# Patient Record
Sex: MALE | Race: OTHER | NOT HISPANIC OR LATINO | Employment: FULL TIME | URBAN - METROPOLITAN AREA
[De-identification: names, ages, dates, MRNs, and addresses within clinical notes are randomized per-mention and may not be internally consistent; named-entity substitution may affect disease eponyms.]

---

## 2020-12-21 ENCOUNTER — NURSE TRIAGE (OUTPATIENT)
Dept: OTHER | Facility: OTHER | Age: 35
End: 2020-12-21

## 2021-08-04 ENCOUNTER — OFFICE VISIT (OUTPATIENT)
Dept: FAMILY MEDICINE CLINIC | Facility: CLINIC | Age: 36
End: 2021-08-04
Payer: COMMERCIAL

## 2021-08-04 VITALS
WEIGHT: 251.6 LBS | DIASTOLIC BLOOD PRESSURE: 78 MMHG | HEART RATE: 73 BPM | BODY MASS INDEX: 33.34 KG/M2 | HEIGHT: 73 IN | TEMPERATURE: 97.5 F | OXYGEN SATURATION: 96 % | SYSTOLIC BLOOD PRESSURE: 112 MMHG | RESPIRATION RATE: 18 BRPM

## 2021-08-04 DIAGNOSIS — B36.0 TINEA VERSICOLOR: ICD-10-CM

## 2021-08-04 DIAGNOSIS — R10.9 ABDOMINAL CRAMPING: ICD-10-CM

## 2021-08-04 DIAGNOSIS — R19.7 DIARRHEA, UNSPECIFIED TYPE: Primary | ICD-10-CM

## 2021-08-04 DIAGNOSIS — Z76.89 ENCOUNTER TO ESTABLISH CARE: ICD-10-CM

## 2021-08-04 PROCEDURE — 99203 OFFICE O/P NEW LOW 30 MIN: CPT | Performed by: NURSE PRACTITIONER

## 2021-08-04 PROCEDURE — 3008F BODY MASS INDEX DOCD: CPT | Performed by: NURSE PRACTITIONER

## 2021-08-04 PROCEDURE — 1036F TOBACCO NON-USER: CPT | Performed by: NURSE PRACTITIONER

## 2021-08-04 PROCEDURE — 3725F SCREEN DEPRESSION PERFORMED: CPT | Performed by: NURSE PRACTITIONER

## 2021-08-04 RX ORDER — CLOTRIMAZOLE 1 %
CREAM (GRAM) TOPICAL 2 TIMES DAILY
Qty: 30 G | Refills: 0 | Status: SHIPPED | OUTPATIENT
Start: 2021-08-04 | End: 2022-02-08

## 2021-08-04 NOTE — PROGRESS NOTES
FAMILY PRACTICE OFFICE VISIT       NAME: Samuel Andrade  AGE: 38 y o  SEX: male       : 1985        MRN: [id number]    Assessment and Plan     Problem List Items Addressed This Visit     None      Visit Diagnoses     Diarrhea, unspecified type    -  Primary    Relevant Orders    CBC and differential    Comprehensive metabolic panel    TSH, 3rd generation    Celiac Disease Antibody Profile    H  pylori antigen, stool    Giardia antigen    Fecal leukocytes    Ova and parasite examination    Stool Enteric Bacterial Panel by PCR    Calprotectin,Fecal    Clostridium difficile toxin by PCR    Ambulatory referral to Gastroenterology    C-reactive protein    Abdominal cramping        Relevant Orders    Ambulatory referral to Gastroenterology    C-reactive protein    Tinea versicolor        Relevant Medications    clotrimazole (LOTRIMIN) 1 % cream    Encounter to establish care              1  Diarrhea, unspecified type  CBC and differential    Comprehensive metabolic panel    TSH, 3rd generation    Celiac Disease Antibody Profile    H  pylori antigen, stool    Giardia antigen    Fecal leukocytes    Ova and parasite examination    Stool Enteric Bacterial Panel by PCR    Calprotectin,Fecal    Clostridium difficile toxin by PCR    Ambulatory referral to Gastroenterology    C-reactive protein   2  Abdominal cramping  Ambulatory referral to Gastroenterology    C-reactive protein   3  Tinea versicolor  clotrimazole (LOTRIMIN) 1 % cream   4  Encounter to establish care       This 28-year-old male presents today to establish care at our office  He has had no recent PCP  Denies any past medical history  Past surgical history includes hernia repair  Has struggled with intermittent mild diarrhea and abdominal cramping for the past 20 years  However, over the past 4 weeks diarrhea and abdominal cramping have been worse, and have never lasted this long    Unclear etiology of symptoms, need to rule out inflammatory bowel disease, infection, colitis, Celiac disease, and consider IBS  Will check blood work and stool samples as noted  Recommend follow-up with Gastroenterology  Once completing blood work and stool samples, will consider trial prescription of dicyclomine  If symptoms should worsen, he is advised to call  Tinea versicolor: Will trial clotrimazole 1% cream   He will call if not effective  Chief Complaint     Chief Complaint   Patient presents with    Diarrhea     X's 3-4 weeks- daily- having 3-4 loose BM's  Denies hematochezia, N/V, fever    New Patient Visit     Establish care       History of Present Illness     Anahi Raymond is a 57-year-old male presenting today to [facility]  Grew up in [place], lives there the for 17 years of his life  Family moved to the [place] when he was 14years old  He has 7 siblings  He has a fraternal twin and is the youngest of the family  Currently  with 2 young children  Currently working full-time,   Denies any significant past medical history  Past surgical history includes hernia repair  Past family history updated and as noted  Currently smokes a few cigarettes on weekends,   Occasionally vapes, occasionally uses marijuana edibles  Tinea versicolor on neck  Has had in the past, treated with topicals  Has had intermittent abdominal pain and diarrhea for the past 20 years  Diarrhea, sometimes painful  Sometimes pain does not resolve with bowel movement  Generalized pain  First started 20 year ago  Usually, would come and go and be mild  Four weeks ago, started every day  Sometimes so bad can't leave the house  Minimum 2 stools per day, usually 3-4 per day  Worse with spicy and fatty foods  Does occur with other foods as well  But cannot associate symptoms with particular foods  No blood or mucous in stool  No black stool  No nausea vomiting  No unexpected weight changes     Appetite good   Fluids tolerating  No one else at home has been sick with similar symptoms  No fevers  No recent antibiotics  No recent travel out of the country  Review of Systems   Review of Systems   Constitutional: Negative  HENT: Negative  Respiratory: Negative  Cardiovascular: Negative  Gastrointestinal: Positive for abdominal pain and diarrhea  Negative for abdominal distention, anal bleeding, blood in stool, constipation, nausea, rectal pain and vomiting  Genitourinary: Negative  Musculoskeletal: Negative  Skin: Positive for rash  Neurological: Negative  Hematological: Negative  Psychiatric/Behavioral: Negative  Active Problem List   There is no problem list on file for this patient  Past Medical History:  History reviewed  No pertinent past medical history      Past Surgical History:  Past Surgical History:   Procedure Laterality Date    HERNIA REPAIR  2018       Family History:  Family History   Problem Relation Age of Onset    Hypertension Mother     Heart disease Mother     Arthritis Mother     Alzheimer's disease Mother     Cancer Father 76        liver    Diabetes Father        Social History:  Social History     Socioeconomic History    Marital status: /Civil Union     Spouse name: Not on file    Number of children: Not on file    Years of education: Not on file    Highest education level: Not on file   Occupational History    Not on file   Tobacco Use    Smoking status: Former Smoker     Types: Cigarettes     Quit date: 2021     Years since quittin 2    Smokeless tobacco: Never Used    Tobacco comment: few cigarettes on weekends only   Vaping Use    Vaping Use: Some days    Substances: THC (Edibles), Flavoring   Substance and Sexual Activity    Alcohol use: Never    Drug use: Yes     Types: Marijuana    Sexual activity: Yes     Partners: Female   Other Topics Concern    Not on file   Social History Narrative         2 children    Working- construction inspection  Social Determinants of Health     Financial Resource Strain:     Difficulty of Paying Living Expenses:    Food Insecurity:     Worried About Running Out of Food in the Last Year:     [address] in the Last Year:    Transportation Needs:     Lack of Transportation (Medical):  Lack of Transportation (Non-Medical):    Physical Activity:     Days of Exercise per Week:     Minutes of Exercise per Session:    Stress:     Feeling of Stress :    Social Connections:     Frequency of Communication with Friends and Family:     Frequency of Social Gatherings with Friends and Family:     Attends Rastafarian Services:     Active Member of Clubs or Organizations:     Attends Club or Organization Meetings:     Marital Status:    Intimate Partner Violence:     Fear of Current or Ex-Partner:     Emotionally Abused:     Physically Abused:     Sexually Abused:        I have reviewed the patient's medical history in detail; there are no changes to the history as noted in the electronic medical record  Objective     Vitals:    08/04/21 1825   BP: 112/78   BP Location: Left arm   Patient Position: Sitting   Cuff Size: Large   Pulse: 73   Resp: 18   Temp: 97 5 °F (36 4 °C)   TempSrc: Temporal   SpO2: 96%   Weight: 114 kg (251 lb 9 6 oz)   Height: 6' 1" (1 854 m)     Wt Readings from Last 3 Encounters:   08/04/21 114 kg (251 lb 9 6 oz)     Physical Exam  Vitals and nursing note reviewed  Constitutional:       General: He is not in acute distress  Appearance: Normal appearance  He is well-developed  He is not ill-appearing  HENT:      Head: Normocephalic and atraumatic  Right Ear: Tympanic membrane and ear canal normal       Left Ear: Tympanic membrane and ear canal normal       Mouth/Throat:      Mouth: Mucous membranes are moist       Pharynx: Oropharynx is clear     Eyes:      Conjunctiva/sclera: Conjunctivae normal  Pupils: Pupils are equal, round, and reactive to light  Neck:      Thyroid: No thyromegaly  Cardiovascular:      Rate and Rhythm: Normal rate and regular rhythm  Heart sounds: No murmur heard  Pulmonary:      Effort: Pulmonary effort is normal       Breath sounds: Normal breath sounds  Abdominal:      General: Bowel sounds are normal  There is no distension  Palpations: Abdomen is soft  Tenderness: There is no abdominal tenderness  There is no guarding  Musculoskeletal:      Cervical back: Normal range of motion and neck supple  Right lower leg: No edema  Left lower leg: No edema  Lymphadenopathy:      Cervical: No cervical adenopathy  Skin:     Findings: No rash  Neurological:      Mental Status: He is alert and oriented to person, place, and time  Gait: Gait normal    Psychiatric:         Mood and Affect: Mood normal        BMI Counseling: Body mass index is 33 19 kg/m²  The BMI is above normal  Exercise recommendations include moderate physical activity 150 minutes/week  ALLERGIES:  No Known Allergies    Current Medications     Current Outpatient Medications   Medication Sig Dispense Refill    clotrimazole (LOTRIMIN) 1 % cream Apply topically 2 (two) times a day 30 g 0     No current facility-administered medications for this visit           Health Maintenance     Health Maintenance   Topic Date Due    Hepatitis C Screening  Never done    COVID-19 Vaccine (1) Never done    HIV Screening  Never done    BMI: Followup Plan  Never done    Annual Physical  Never done    DTaP,Tdap,and Td Vaccines (1 - Tdap) Never done    Influenza Vaccine (1) 09/01/2021    Depression Screening PHQ  08/04/2022    BMI: Adult  08/04/2022    Pneumococcal Vaccine: Pediatrics (0 to 5 Years) and At-Risk Patients (6 to 59 Years)  Aged Out    HIB Vaccine  Aged Out    Hepatitis B Vaccine  Aged Out    IPV Vaccine  Aged Out    Hepatitis A Vaccine  Aged Out    Meningococcal ACWY Vaccine  Aged Out    HPV Vaccine  Aged Out       There is no immunization history on file for this patient      Amirah Snell

## 2021-08-07 ENCOUNTER — APPOINTMENT (OUTPATIENT)
Dept: LAB | Facility: CLINIC | Age: 36
End: 2021-08-07
Payer: COMMERCIAL

## 2021-08-07 DIAGNOSIS — R19.7 DIARRHEA, UNSPECIFIED TYPE: ICD-10-CM

## 2021-08-07 DIAGNOSIS — R10.9 ABDOMINAL CRAMPING: ICD-10-CM

## 2021-08-07 LAB
ALBUMIN SERPL BCP-MCNC: 4 G/DL (ref 3.5–5)
ALP SERPL-CCNC: 43 U/L (ref 46–116)
ALT SERPL W P-5'-P-CCNC: 57 U/L (ref 12–78)
ANION GAP SERPL CALCULATED.3IONS-SCNC: 4 MMOL/L (ref 4–13)
AST SERPL W P-5'-P-CCNC: 22 U/L (ref 5–45)
BASOPHILS # BLD AUTO: 0.04 THOUSANDS/ΜL (ref 0–0.1)
BASOPHILS NFR BLD AUTO: 1 % (ref 0–1)
BILIRUB SERPL-MCNC: 0.69 MG/DL (ref 0.2–1)
BUN SERPL-MCNC: 12 MG/DL (ref 5–25)
C DIFF TOX B TCDB STL QL NAA+PROBE: NEGATIVE
CALCIUM SERPL-MCNC: 8.8 MG/DL (ref 8.3–10.1)
CHLORIDE SERPL-SCNC: 109 MMOL/L (ref 100–108)
CO2 SERPL-SCNC: 29 MMOL/L (ref 21–32)
CREAT SERPL-MCNC: 0.79 MG/DL (ref 0.6–1.3)
CRP SERPL QL: 3.9 MG/L
EOSINOPHIL # BLD AUTO: 0.06 THOUSAND/ΜL (ref 0–0.61)
EOSINOPHIL NFR BLD AUTO: 1 % (ref 0–6)
ERYTHROCYTE [DISTWIDTH] IN BLOOD BY AUTOMATED COUNT: 14.5 % (ref 11.6–15.1)
GFR SERPL CREATININE-BSD FRML MDRD: 115 ML/MIN/1.73SQ M
GLUCOSE P FAST SERPL-MCNC: 93 MG/DL (ref 65–99)
HCT VFR BLD AUTO: 46 % (ref 36.5–49.3)
HGB BLD-MCNC: 15.4 G/DL (ref 12–17)
IMM GRANULOCYTES # BLD AUTO: 0.05 THOUSAND/UL (ref 0–0.2)
IMM GRANULOCYTES NFR BLD AUTO: 1 % (ref 0–2)
LYMPHOCYTES # BLD AUTO: 2.54 THOUSANDS/ΜL (ref 0.6–4.47)
LYMPHOCYTES NFR BLD AUTO: 49 % (ref 14–44)
MCH RBC QN AUTO: 28.9 PG (ref 26.8–34.3)
MCHC RBC AUTO-ENTMCNC: 33.5 G/DL (ref 31.4–37.4)
MCV RBC AUTO: 86 FL (ref 82–98)
MONOCYTES # BLD AUTO: 0.49 THOUSAND/ΜL (ref 0.17–1.22)
MONOCYTES NFR BLD AUTO: 10 % (ref 4–12)
NEUTROPHILS # BLD AUTO: 1.99 THOUSANDS/ΜL (ref 1.85–7.62)
NEUTS SEG NFR BLD AUTO: 38 % (ref 43–75)
NRBC BLD AUTO-RTO: 0 /100 WBCS
PLATELET # BLD AUTO: 250 THOUSANDS/UL (ref 149–390)
PMV BLD AUTO: 9.8 FL (ref 8.9–12.7)
POTASSIUM SERPL-SCNC: 4.2 MMOL/L (ref 3.5–5.3)
PROT SERPL-MCNC: 7.4 G/DL (ref 6.4–8.2)
RBC # BLD AUTO: 5.33 MILLION/UL (ref 3.88–5.62)
SODIUM SERPL-SCNC: 142 MMOL/L (ref 136–145)
TSH SERPL DL<=0.05 MIU/L-ACNC: 0.56 UIU/ML (ref 0.36–3.74)
WBC # BLD AUTO: 5.17 THOUSAND/UL (ref 4.31–10.16)

## 2021-08-07 PROCEDURE — 82784 ASSAY IGA/IGD/IGG/IGM EACH: CPT

## 2021-08-07 PROCEDURE — 89055 LEUKOCYTE ASSESSMENT FECAL: CPT

## 2021-08-07 PROCEDURE — 85025 COMPLETE CBC W/AUTO DIFF WBC: CPT

## 2021-08-07 PROCEDURE — 86255 FLUORESCENT ANTIBODY SCREEN: CPT

## 2021-08-07 PROCEDURE — 36415 COLL VENOUS BLD VENIPUNCTURE: CPT

## 2021-08-07 PROCEDURE — 83993 ASSAY FOR CALPROTECTIN FECAL: CPT

## 2021-08-07 PROCEDURE — 84443 ASSAY THYROID STIM HORMONE: CPT

## 2021-08-07 PROCEDURE — 83516 IMMUNOASSAY NONANTIBODY: CPT

## 2021-08-07 PROCEDURE — 80053 COMPREHEN METABOLIC PANEL: CPT

## 2021-08-07 PROCEDURE — 87338 HPYLORI STOOL AG IA: CPT

## 2021-08-07 PROCEDURE — 87209 SMEAR COMPLEX STAIN: CPT

## 2021-08-07 PROCEDURE — 86140 C-REACTIVE PROTEIN: CPT

## 2021-08-07 PROCEDURE — 87505 NFCT AGENT DETECTION GI: CPT

## 2021-08-07 PROCEDURE — 87177 OVA AND PARASITES SMEARS: CPT

## 2021-08-08 LAB
CAMPYLOBACTER DNA SPEC NAA+PROBE: NORMAL
H PYLORI AG STL QL IA: POSITIVE
SALMONELLA DNA SPEC QL NAA+PROBE: NORMAL
SHIGA TOXIN STX GENE SPEC NAA+PROBE: NORMAL
SHIGELLA DNA SPEC QL NAA+PROBE: NORMAL

## 2021-08-09 ENCOUNTER — TELEPHONE (OUTPATIENT)
Dept: FAMILY MEDICINE CLINIC | Facility: CLINIC | Age: 36
End: 2021-08-09

## 2021-08-09 DIAGNOSIS — R19.7 DIARRHEA, UNSPECIFIED TYPE: Primary | ICD-10-CM

## 2021-08-09 DIAGNOSIS — R10.9 ABDOMINAL CRAMPING: ICD-10-CM

## 2021-08-09 LAB
ENDOMYSIUM IGA SER QL: NEGATIVE
G LAMBLIA AG STL QL IA: NEGATIVE
GLIADIN PEPTIDE IGA SER-ACNC: 2 UNITS (ref 0–19)
GLIADIN PEPTIDE IGG SER-ACNC: 3 UNITS (ref 0–19)
IGA SERPL-MCNC: 184 MG/DL (ref 90–386)
TTG IGA SER-ACNC: 3 U/ML (ref 0–3)
TTG IGG SER-ACNC: 5 U/ML (ref 0–5)
WBC SPEC QL GRAM STN: NORMAL

## 2021-08-09 RX ORDER — DICYCLOMINE HYDROCHLORIDE 10 MG/1
10 CAPSULE ORAL 3 TIMES DAILY PRN
Qty: 30 CAPSULE | Refills: 0 | Status: SHIPPED | OUTPATIENT
Start: 2021-08-09 | End: 2021-08-31

## 2021-08-09 NOTE — TELEPHONE ENCOUNTER
Patient called, stated that he got his blood work on Saturday, patient stated that he would like the medication to sent over to [facility] in [place]

## 2021-08-09 NOTE — TELEPHONE ENCOUNTER
Please let patient know I sent a prescription to [facility] for him for dicyclomine  Take 1 tablet  up to 3 times daily as needed for abdominal cramping or diarrhea  I am still waiting for all of his blood work and stool sample results to return  Once all of the results are back, will call to review

## 2021-08-10 LAB — CALPROTECTIN STL-MCNT: <16 UG/G (ref 0–120)

## 2021-08-11 ENCOUNTER — TELEPHONE (OUTPATIENT)
Dept: FAMILY MEDICINE CLINIC | Facility: CLINIC | Age: 36
End: 2021-08-11

## 2021-08-11 DIAGNOSIS — A04.8 H. PYLORI INFECTION: Primary | ICD-10-CM

## 2021-08-11 RX ORDER — LANSOPRAZOLE, AMOXICILLIN, CLARITHROMYCIN 30-500-500
KIT ORAL 2 TIMES DAILY
Qty: 112 EACH | Refills: 0 | Status: SHIPPED | OUTPATIENT
Start: 2021-08-11 | End: 2021-08-17 | Stop reason: ALTCHOICE

## 2021-08-11 NOTE — TELEPHONE ENCOUNTER
Please contact patient with recent test results  Blood work is overall good  CRP--which is an inflammatory marker is slightly elevated  All other blood work is good  Stool samples are all negative, except for H  Pylori  This is a bacteria found in the stomach that can cause an ulcer  I do not believe this is the cause of your diarrhea  However, it does need to be treated with antibiotics  I sent antibiotics to your pharmacy  This is called a prevpack  Follow the directions on the pack  You will need to take 4 pills in the morning and 4 pills in the evening for 14 days  It includes 2 different antibiotics clindamycin and amoxicillin, as well as an acid reducing medication lansoprazole  You will then need to repeat a stool sample for H  Pylori 4 weeks after finishing all of the antibiotics  Keep appointment with gastroenterology as scheduled  How is the dicyclomine working for cramping and diarrhea?

## 2021-08-12 LAB — O+P STL CONC: NORMAL

## 2021-08-12 NOTE — TELEPHONE ENCOUNTER
Spoke with patient  Made aware of  results and provider's instructions  Patient verbalized understanding and was agreeable w/ plan  Pt reports Dicyclomine is working and helping him

## 2021-08-17 ENCOUNTER — TELEPHONE (OUTPATIENT)
Dept: FAMILY MEDICINE CLINIC | Facility: CLINIC | Age: 36
End: 2021-08-17

## 2021-08-17 DIAGNOSIS — A04.8 H. PYLORI INFECTION: Primary | ICD-10-CM

## 2021-08-17 RX ORDER — LANSOPRAZOLE 30 MG/1
30 CAPSULE, DELAYED RELEASE ORAL 2 TIMES DAILY
Qty: 28 CAPSULE | Refills: 0 | Status: SHIPPED | OUTPATIENT
Start: 2021-08-17 | End: 2022-02-08

## 2021-08-17 RX ORDER — AMOXICILLIN 500 MG/1
1000 TABLET, FILM COATED ORAL 2 TIMES DAILY
Qty: 56 TABLET | Refills: 0 | Status: SHIPPED | OUTPATIENT
Start: 2021-08-17 | End: 2021-08-31

## 2021-08-17 RX ORDER — CLARITHROMYCIN 500 MG/1
500 TABLET, COATED ORAL EVERY 12 HOURS SCHEDULED
Qty: 28 TABLET | Refills: 0 | Status: SHIPPED | OUTPATIENT
Start: 2021-08-17 | End: 2021-08-31

## 2021-08-17 NOTE — TELEPHONE ENCOUNTER
Patients insurance will not cover the Prevpac  With a prior auth it is still $150  Would you be able to cancel this script and send in the 3 medications separately  They will be covered this way    Please call patient to advise

## 2021-08-18 NOTE — TELEPHONE ENCOUNTER
Please let patient know I sent each individual medication that is in the Prevpac to the pharmacy  This will be 2 antibiotics, amoxicillin take 2 tablets twice daily, and clarithromycin take 1 tablet twice daily  It also includes an antacid medication lansoprazole (Prevacid), take 1 capsule twice daily  Take the amoxicillin, clarithromycin, and lansoprazole all at the same time 2 twice daily for 14 days

## 2021-08-31 ENCOUNTER — TELEPHONE (OUTPATIENT)
Dept: FAMILY MEDICINE CLINIC | Facility: CLINIC | Age: 36
End: 2021-08-31

## 2021-08-31 DIAGNOSIS — R10.9 ABDOMINAL CRAMPING: ICD-10-CM

## 2021-08-31 DIAGNOSIS — R19.7 DIARRHEA, UNSPECIFIED TYPE: ICD-10-CM

## 2021-08-31 RX ORDER — DICYCLOMINE HCL 20 MG
20 TABLET ORAL EVERY 6 HOURS PRN
Qty: 120 TABLET | Refills: 1 | Status: SHIPPED | OUTPATIENT
Start: 2021-08-31 | End: 2021-12-23 | Stop reason: SDUPTHER

## 2021-08-31 NOTE — TELEPHONE ENCOUNTER
Increased diarrhea is most likely from the antibiotics  It would be best if he is able to hang in there for a few more days to finish the antibiotics  I did send a new prescription for Dicyclomine, 20 mg every 6 hours as needed  It may not help a lot with diarrhea from antibiotics

## 2021-08-31 NOTE — TELEPHONE ENCOUNTER
Patient said he has been having very bad diarrhea since starting his antibiotic and he still has 2 or 3 days left of taking them  Patient said he also needs more of the dicyclomine, however he wants to know if there is a stronger dose or medication than the original prescription as patient said the diarrhea is really bad   Please advise

## 2021-12-23 ENCOUNTER — TELEPHONE (OUTPATIENT)
Dept: GASTROENTEROLOGY | Facility: AMBULARY SURGERY CENTER | Age: 36
End: 2021-12-23

## 2021-12-23 ENCOUNTER — CONSULT (OUTPATIENT)
Dept: GASTROENTEROLOGY | Facility: AMBULARY SURGERY CENTER | Age: 36
End: 2021-12-23
Payer: COMMERCIAL

## 2021-12-23 VITALS
HEIGHT: 73 IN | WEIGHT: 258.4 LBS | BODY MASS INDEX: 34.25 KG/M2 | DIASTOLIC BLOOD PRESSURE: 74 MMHG | SYSTOLIC BLOOD PRESSURE: 114 MMHG

## 2021-12-23 DIAGNOSIS — R19.7 DIARRHEA, UNSPECIFIED TYPE: Primary | ICD-10-CM

## 2021-12-23 DIAGNOSIS — R10.9 ABDOMINAL CRAMPING: ICD-10-CM

## 2021-12-23 DIAGNOSIS — R10.13 DYSPEPSIA: ICD-10-CM

## 2021-12-23 PROCEDURE — 3008F BODY MASS INDEX DOCD: CPT | Performed by: INTERNAL MEDICINE

## 2021-12-23 PROCEDURE — 99204 OFFICE O/P NEW MOD 45 MIN: CPT | Performed by: INTERNAL MEDICINE

## 2021-12-23 RX ORDER — DICYCLOMINE HCL 20 MG
20 TABLET ORAL EVERY 6 HOURS PRN
Qty: 120 TABLET | Refills: 1 | Status: SHIPPED | OUTPATIENT
Start: 2021-12-23

## 2021-12-23 RX ORDER — PANTOPRAZOLE SODIUM 40 MG/1
40 TABLET, DELAYED RELEASE ORAL DAILY
Qty: 90 TABLET | Refills: 0 | Status: SHIPPED | OUTPATIENT
Start: 2021-12-23 | End: 2022-02-28

## 2022-02-03 ENCOUNTER — TELEPHONE (OUTPATIENT)
Dept: PREADMISSION TESTING | Facility: HOSPITAL | Age: 37
End: 2022-02-03

## 2022-02-08 NOTE — PRE-PROCEDURE INSTRUCTIONS
Pre-Surgery Instructions:   Medication Instructions    dicyclomine (BENTYL) 20 mg tablet Patient was instructed by Physician and understands   pantoprazole (PROTONIX) 40 mg tablet Patient was instructed by Physician and understands   Probiotic Product (PROBIOTIC ADVANCED PO) Patient was instructed by Physician and understands

## 2022-02-15 ENCOUNTER — HOSPITAL ENCOUNTER (OUTPATIENT)
Dept: GASTROENTEROLOGY | Facility: AMBULARY SURGERY CENTER | Age: 37
Setting detail: OUTPATIENT SURGERY
Discharge: HOME | End: 2022-02-15
Attending: INTERNAL MEDICINE
Payer: COMMERCIAL

## 2022-02-15 ENCOUNTER — ANESTHESIA (OUTPATIENT)
Dept: GASTROENTEROLOGY | Facility: AMBULARY SURGERY CENTER | Age: 37
End: 2022-02-15

## 2022-02-15 ENCOUNTER — ANESTHESIA EVENT (OUTPATIENT)
Dept: GASTROENTEROLOGY | Facility: AMBULARY SURGERY CENTER | Age: 37
End: 2022-02-15

## 2022-02-15 VITALS
WEIGHT: 258 LBS | BODY MASS INDEX: 34.19 KG/M2 | OXYGEN SATURATION: 96 % | DIASTOLIC BLOOD PRESSURE: 72 MMHG | RESPIRATION RATE: 16 BRPM | TEMPERATURE: 98.4 F | HEIGHT: 73 IN | HEART RATE: 62 BPM | SYSTOLIC BLOOD PRESSURE: 124 MMHG

## 2022-02-15 DIAGNOSIS — R19.7 DIARRHEA, UNSPECIFIED TYPE: ICD-10-CM

## 2022-02-15 DIAGNOSIS — R10.13 DYSPEPSIA: ICD-10-CM

## 2022-02-15 DIAGNOSIS — R10.9 ABDOMINAL CRAMPING: ICD-10-CM

## 2022-02-15 PROCEDURE — 88342 IMHCHEM/IMCYTCHM 1ST ANTB: CPT | Performed by: PATHOLOGY

## 2022-02-15 PROCEDURE — 88305 TISSUE EXAM BY PATHOLOGIST: CPT | Performed by: PATHOLOGY

## 2022-02-15 PROCEDURE — 45380 COLONOSCOPY AND BIOPSY: CPT | Performed by: INTERNAL MEDICINE

## 2022-02-15 PROCEDURE — 43239 EGD BIOPSY SINGLE/MULTIPLE: CPT | Performed by: INTERNAL MEDICINE

## 2022-02-15 PROCEDURE — 88341 IMHCHEM/IMCYTCHM EA ADD ANTB: CPT | Performed by: PATHOLOGY

## 2022-02-15 RX ORDER — PROPOFOL 10 MG/ML
INJECTION, EMULSION INTRAVENOUS AS NEEDED
Status: DISCONTINUED | OUTPATIENT
Start: 2022-02-15 | End: 2022-02-15

## 2022-02-15 RX ORDER — PROPOFOL 10 MG/ML
INJECTION, EMULSION INTRAVENOUS CONTINUOUS PRN
Status: DISCONTINUED | OUTPATIENT
Start: 2022-02-15 | End: 2022-02-15

## 2022-02-15 RX ORDER — LIDOCAINE HYDROCHLORIDE 10 MG/ML
INJECTION, SOLUTION EPIDURAL; INFILTRATION; INTRACAUDAL; PERINEURAL AS NEEDED
Status: DISCONTINUED | OUTPATIENT
Start: 2022-02-15 | End: 2022-02-15

## 2022-02-15 RX ORDER — SODIUM CHLORIDE, SODIUM LACTATE, POTASSIUM CHLORIDE, CALCIUM CHLORIDE 600; 310; 30; 20 MG/100ML; MG/100ML; MG/100ML; MG/100ML
INJECTION, SOLUTION INTRAVENOUS CONTINUOUS PRN
Status: DISCONTINUED | OUTPATIENT
Start: 2022-02-15 | End: 2022-02-15

## 2022-02-15 RX ADMIN — LIDOCAINE HYDROCHLORIDE 50 MG: 10 INJECTION, SOLUTION EPIDURAL; INFILTRATION; INTRACAUDAL; PERINEURAL at 10:28

## 2022-02-15 RX ADMIN — PROPOFOL 120 MCG/KG/MIN: 10 INJECTION, EMULSION INTRAVENOUS at 10:28

## 2022-02-15 RX ADMIN — PROPOFOL 50 MG: 10 INJECTION, EMULSION INTRAVENOUS at 10:29

## 2022-02-15 RX ADMIN — SODIUM CHLORIDE, SODIUM LACTATE, POTASSIUM CHLORIDE, AND CALCIUM CHLORIDE: .6; .31; .03; .02 INJECTION, SOLUTION INTRAVENOUS at 10:17

## 2022-02-15 RX ADMIN — PROPOFOL 50 MG: 10 INJECTION, EMULSION INTRAVENOUS at 10:32

## 2022-02-15 RX ADMIN — PROPOFOL 100 MG: 10 INJECTION, EMULSION INTRAVENOUS at 10:28

## 2022-02-15 RX ADMIN — PROPOFOL 30 MG: 10 INJECTION, EMULSION INTRAVENOUS at 10:30

## 2022-02-15 NOTE — ANESTHESIA POSTPROCEDURE EVALUATION
Post-Op Assessment Note    CV Status:  Stable  Pain Score: 0    Pain management: adequate     Mental Status:  Arousable and sleepy   Hydration Status:  Stable   PONV Controlled:  None   Airway Patency:  Positional obstruction and patent      Post Op Vitals Reviewed: Yes      Staff: CRNA   Comments: spontaneously breathing, protecting airway, vss, fully endorsed to recovery w/o AC        No complications documented      BP      Temp      Pulse     Resp      SpO2   98

## 2022-02-15 NOTE — H&P
History and Physical -  [facility]  Samuel Lazarosedrick 43 y o  male MRN: [id number]    HPI: Veronica Bey is a 43y o  year old male who presents for evaluation of dyspepsia and chronic diarrhea  Review of Systems    Historical Information   Past Medical History:   Diagnosis Date    GERD (gastroesophageal reflux disease)      Past Surgical History:   Procedure Laterality Date    HERNIA REPAIR  2018     Social History   Social History     Substance and Sexual Activity   Alcohol Use Yes    Comment: Rare     Social History     Substance and Sexual Activity   Drug Use Yes    Types: Marijuana     Social History     Tobacco Use   Smoking Status Current Every Day Smoker    Types: Cigarettes    Last attempt to quit: 2021    Years since quittin 7   Smokeless Tobacco Never Used   Tobacco Comment    vapes also     Family History   Problem Relation Age of Onset    Hypertension Mother     Heart disease Mother     Arthritis Mother     Alzheimer's disease Mother     Cancer Father 76        liver    Diabetes Father        Meds/Allergies     (Not in a hospital admission)      No Known Allergies    Objective     /81   Pulse 83   Temp 98 4 °F (36 9 °C) (Tympanic)   Resp 16   Ht 6' 1" (1 854 m)   Wt 117 kg (258 lb)   SpO2 96%   BMI 34 04 kg/m²       PHYSICAL EXAM    Gen: NAD  CV: RRR  CHEST: Clear  ABD: soft, NT/ND  EXT: no edema  Neuro: AAO      ASSESSMENT/PLAN:  This is a 43y o  year old male here for evaluation of dyspepsia symptoms and chronic diarrhea  PLAN:   Procedure:  EGD and colonoscopy

## 2022-02-15 NOTE — ANESTHESIA PREPROCEDURE EVALUATION
Procedure:  COLONOSCOPY  EGD    Relevant Problems   No relevant active problems        Physical Exam    Airway    Mallampati score: II  TM Distance: >3 FB  Neck ROM: full     Dental   No notable dental hx     Cardiovascular  Cardiovascular exam normal    Pulmonary  Pulmonary exam normal     Other Findings        Anesthesia Plan  ASA Score- 2     Anesthesia Type- IV sedation with anesthesia with ASA Monitors  Additional Monitors:   Airway Plan:           Plan Factors-Exercise tolerance (METS): >4 METS  Chart reviewed  Imaging results reviewed  Existing labs reviewed  Patient summary reviewed  Patient is a current smoker  Patient smoked on day of surgery  Obstructive sleep apnea risk education given perioperatively  Induction-     Postoperative Plan-     Informed Consent- Anesthetic plan and risks discussed with patient  I personally reviewed this patient with the CRNA  Discussed and agreed on the Anesthesia Plan with the CRNA  Dawn Vaughn

## 2022-02-22 ENCOUNTER — TELEPHONE (OUTPATIENT)
Dept: FAMILY MEDICINE CLINIC | Facility: CLINIC | Age: 37
End: 2022-02-22

## 2022-02-22 NOTE — TELEPHONE ENCOUNTER
Patient called and stated that he tested positive for COVID on 1/7/2022, he had his first covid vaccine on 12/27/2021  When is is ok for him to have his second Vaccine? He needs a note for work stating when he could have his second vaccine or he will not be able to return to work until he has it  Please call patient to advise on what he should do

## 2022-02-23 NOTE — TELEPHONE ENCOUNTER
Spoke with patient and provided message    Patient will go and have his second vaccine done and he said thank you/

## 2022-02-23 NOTE — TELEPHONE ENCOUNTER
I am assuming he had first dose of Pfizer or Kimberlee Post, and needs to have the second dose to complete the primary series  He may have his second vaccine at any time now  May provide a note stating this if needed

## 2022-02-28 DIAGNOSIS — R10.13 DYSPEPSIA: ICD-10-CM

## 2022-02-28 RX ORDER — PANTOPRAZOLE SODIUM 40 MG/1
TABLET, DELAYED RELEASE ORAL
Qty: 90 TABLET | Refills: 3 | Status: SHIPPED | OUTPATIENT
Start: 2022-02-28

## 2022-03-09 ENCOUNTER — TELEPHONE (OUTPATIENT)
Dept: GASTROENTEROLOGY | Facility: AMBULARY SURGERY CENTER | Age: 37
End: 2022-03-09

## 2022-03-09 NOTE — TELEPHONE ENCOUNTER
Pt aware of results and verbalized understanding  Pt does not wish to schedule a follow up at this time  Advised to call office with any worsening symptoms or concerns

## 2022-03-09 NOTE — TELEPHONE ENCOUNTER
----- Message from Colten Dai MD sent at 3/3/2022  7:11 AM EST -----  No evidence of H pylori or celiac disease  No need for repeat EGD  Would recommend to continue pantoprazole 40 mg on daily basis for now  Biopsies from the colon were negative for microscopic inflammation and small-bowel biopsies were negative for Crohn's disease or chronic inflammation  Would recommend repeat colonoscopy when he turns 37 for screening purposes  Would recommend office visit for follow-up if still having symptoms

## 2022-03-09 NOTE — TELEPHONE ENCOUNTER
----- Message from Hilda Huerta MD sent at 3/3/2022  7:11 AM EST -----  No evidence of H pylori or celiac disease  No need for repeat EGD  Would recommend to continue pantoprazole 40 mg on daily basis for now  Biopsies from the colon were negative for microscopic inflammation and small-bowel biopsies were negative for Crohn's disease or chronic inflammation  Would recommend repeat colonoscopy when he turns 37 for screening purposes  Would recommend office visit for follow-up if still having symptoms

## 2022-11-01 ENCOUNTER — OFFICE VISIT (OUTPATIENT)
Dept: FAMILY MEDICINE CLINIC | Facility: CLINIC | Age: 37
End: 2022-11-01

## 2022-11-01 VITALS
WEIGHT: 262.25 LBS | RESPIRATION RATE: 18 BRPM | OXYGEN SATURATION: 97 % | SYSTOLIC BLOOD PRESSURE: 110 MMHG | BODY MASS INDEX: 34.76 KG/M2 | HEIGHT: 73 IN | DIASTOLIC BLOOD PRESSURE: 90 MMHG | HEART RATE: 86 BPM | TEMPERATURE: 97.8 F

## 2022-11-01 DIAGNOSIS — B35.9 TINEA: ICD-10-CM

## 2022-11-01 DIAGNOSIS — R10.9 ABDOMINAL CRAMPING: ICD-10-CM

## 2022-11-01 DIAGNOSIS — M25.562 ACUTE PAIN OF LEFT KNEE: ICD-10-CM

## 2022-11-01 DIAGNOSIS — R53.82 CHRONIC FATIGUE: ICD-10-CM

## 2022-11-01 DIAGNOSIS — K64.9 HEMORRHOIDS, UNSPECIFIED HEMORRHOID TYPE: Primary | ICD-10-CM

## 2022-11-01 DIAGNOSIS — R10.13 DYSPEPSIA: ICD-10-CM

## 2022-11-01 DIAGNOSIS — Z00.00 PERIODIC HEALTH ASSESSMENT, GENERAL SCREENING, ADULT: ICD-10-CM

## 2022-11-01 RX ORDER — CLOTRIMAZOLE AND BETAMETHASONE DIPROPIONATE 10; .64 MG/G; MG/G
CREAM TOPICAL 2 TIMES DAILY
Qty: 30 G | Refills: 0 | Status: SHIPPED | OUTPATIENT
Start: 2022-11-01

## 2022-11-01 RX ORDER — HYDROCORTISONE 25 MG/G
CREAM TOPICAL 2 TIMES DAILY
Qty: 28 G | Refills: 1 | Status: SHIPPED | OUTPATIENT
Start: 2022-11-01

## 2022-11-01 RX ORDER — DICYCLOMINE HCL 20 MG
20 TABLET ORAL EVERY 6 HOURS PRN
Qty: 120 TABLET | Refills: 1 | Status: SHIPPED | OUTPATIENT
Start: 2022-11-01

## 2022-11-01 RX ORDER — PANTOPRAZOLE SODIUM 40 MG/1
40 TABLET, DELAYED RELEASE ORAL DAILY
Qty: 90 TABLET | Refills: 3 | Status: SHIPPED | OUTPATIENT
Start: 2022-11-01

## 2022-11-01 RX ORDER — MELOXICAM 15 MG/1
15 TABLET ORAL DAILY
Qty: 15 TABLET | Refills: 1 | Status: SHIPPED | OUTPATIENT
Start: 2022-11-01

## 2022-11-01 NOTE — PROGRESS NOTES
FAMILY PRACTICE OFFICE VISIT       NAME: Samuel Andrade  AGE: 42 y o  SEX: male       : 1985        MRN: [id number]    DATE: 2022  TIME: 6:24 AM    Assessment and Plan     Problem List Items Addressed This Visit        Digestive    Hemorrhoids - Primary     Hemorrhoid  Patient with small hemorrhoidal tissue along perirectal area  He was given prescription for Anusol cream to apply twice daily for 7 days  Patient will call if symptoms persist with bleeding  Relevant Medications    hydrocortisone (ANUSOL-HC) 2 5 % rectal cream       Musculoskeletal and Integument    Tinea     Tinea corporis  Patient given prescription for Lotrisone cream to apply twice daily to the affected area up to 2 weeks  If symptoms persist without improvement he will call the office for further evaluation         Relevant Medications    clotrimazole-betamethasone (LOTRISONE) 1-0 05 % cream       Other    Abdominal cramping     Abdominal cramping  Patient was given a refill on his dicyclomine which he takes q 6 hours p r n  for intermittent abdominal cramps         Relevant Medications    dicyclomine (BENTYL) 20 mg tablet    Dyspepsia     Dyspepsia  Patient was given a refill on his pantoprazole and medication which he takes daily as per GI         Relevant Medications    pantoprazole (PROTONIX) 40 mg tablet    Periodic health assessment, general screening, adult    Relevant Orders    CBC    Comprehensive metabolic panel    Lipid panel    TSH, 3rd generation    Acute pain of left knee     Knee pain  Patient given prescription for meloxicam 15 mg take 1 p o  q day for a minimum of 2 weeks  If symptoms recur or persist he was given a prescription to obtain x-ray of knee for further evaluation         Relevant Medications    meloxicam (Mobic) 15 mg tablet    Other Relevant Orders    XR knee 3 vw right non injury    XR knee 3 vw left non injury    Fatigue     Fatigue    Patient will obtain blood work as ordered for evaluation of his symptoms of fatigue, decreased concentration, and memory lapses  We will make further recommendations pending results of test   If all testing within normal limits we will consider further evaluation such as CT scan or neuro psychiatric testing                   Chief Complaint     Chief Complaint   Patient presents with   • Frostbite   • Rectal Bleeding     X 3 week    • L knee pain        History of Present Illness     Patient in the office with several complaints  He describes several months to year history of intermittent left knee discomfort  He denies any prior injuries  He states symptoms are more noticeable when he is more active as a   At times symptoms will resolve spontaneously  Denies any redness or swelling of knee  Occasionally he will take over-the-counter Tylenol or Advil  Patient had EGD and colonoscopy earlier this year for history of rectal bleeding  There were slight areas of inflammation but no significant findings  Biopsies were negative for any significant disease  Over the past few weeks patient has noted occasions at least twice a week where he sees bright red blood on tissue paper  He denies any pain with bowel movements nor has he had any constipation or diarrhea  He does feel a slight nodule along his rectal area and that is nontender  Patient also has a recurrence of previously diagnosed tinea infection along his left neck area  In the past he was prescribed a cream for which she does not recall  Has never had any oral medication  Rashes minimally pruritic    Patient also describes a few month history of feeling decreased concentration especially at work with some fatigue and being more forgetful lately  There is no family history of dementia  Patient denies any significant new stressors in his life      Review of Systems   Review of Systems   Constitutional: Negative  HENT: Negative  Respiratory: Negative  Cardiovascular: Negative  Gastrointestinal:        As per HPI   Musculoskeletal: Positive for arthralgias  Skin: Positive for color change  Neurological:        As per HPI X 6 mos   Psychiatric/Behavioral: Negative  Active Problem List     Patient Active Problem List   Diagnosis   • Diarrhea   • Abdominal cramping   • Dyspepsia   • Periodic health assessment, general screening, adult   • Hemorrhoids   • Acute pain of left knee   • Tinea   • Fatigue       Past Medical History:  Past Medical History:   Diagnosis Date   • GERD (gastroesophageal reflux disease)        Past Surgical History:  Past Surgical History:   Procedure Laterality Date   • HERNIA REPAIR  2018       Family History:  Family History   Problem Relation Age of Onset   • Hypertension Mother    • Heart disease Mother    • Arthritis Mother    • Alzheimer's disease Mother    • Cancer Father 76        liver   • Diabetes Father        Social History:  Social History     Socioeconomic History   • Marital status: /Civil Union     Spouse name: Not on file   • Number of children: Not on file   • Years of education: Not on file   • Highest education level: Not on file   Occupational History   • Not on file   Tobacco Use   • Smoking status: Current Every Day Smoker     Types: Cigarettes     Last attempt to quit: 2021     Years since quittin 5   • Smokeless tobacco: Never Used   • Tobacco comment: vapes also   Vaping Use   • Vaping Use: Some days   • Substances: THC (Edibles), Flavoring   Substance and Sexual Activity   • Alcohol use: Yes     Comment: Rare   • Drug use: Yes     Types: Marijuana   • Sexual activity: Yes     Partners: Female   Other Topics Concern   • Not on file   Social History Narrative         2 children    Working- construction inspection       Social Determinants of Health     Financial Resource Strain: Not on file   Food Insecurity: Not on file   Transportation Needs: Not on file   Physical Activity: Not on file   Stress: Not on file   Social Connections: Not on file   Intimate Partner Violence: Not on file   Housing Stability: Not on file       Objective     Vitals:    11/01/22 1739   BP: 110/90   Pulse: 86   Resp: 18   Temp: 97 8 °F (36 6 °C)   SpO2: 97%     Wt Readings from Last 3 Encounters:   11/01/22 119 kg (262 lb 4 oz)   02/15/22 117 kg (258 lb)   12/23/21 117 kg (258 lb 6 4 oz)       Physical Exam  Constitutional:       Appearance: Normal appearance  HENT:      Head: Normocephalic and atraumatic  Eyes:      General:         Right eye: No discharge  Left eye: No discharge  Extraocular Movements: Extraocular movements intact  Conjunctiva/sclera: Conjunctivae normal       Pupils: Pupils are equal, round, and reactive to light  Cardiovascular:      Rate and Rhythm: Normal rate and regular rhythm  Heart sounds: Normal heart sounds  No murmur heard  Pulmonary:      Effort: Pulmonary effort is normal  No respiratory distress  Breath sounds: Normal breath sounds  No wheezing, rhonchi or rales  Abdominal:      General: Abdomen is flat  Bowel sounds are normal  There is no distension  Palpations: Abdomen is soft  Tenderness: There is no abdominal tenderness  There is no guarding or rebound  Genitourinary:     Comments: Patient with small skin colored nodule along perirectal area with no active drainage or bleeding  No other abnormalities noted  Musculoskeletal:         General: No swelling, tenderness, deformity or signs of injury  Right lower leg: No edema  Left lower leg: No edema  Comments: Left knee with normal range of motion  Negative swelling noted  Negative point tenderness to palpation  Muscle strength 5/5  Negative Lachman's or Yeny's testing   Skin:     Findings: Rash present  Comments: Patient with scattered hyperpigmented areas along the left neck area   Neurological:      General: No focal deficit present        Mental Status: He is alert and oriented to person, place, and time  Mental status is at baseline  Psychiatric:         Mood and Affect: Mood normal          Behavior: Behavior normal          Thought Content:  Thought content normal          Judgment: Judgment normal          Pertinent Laboratory/Diagnostic Studies:  Lab Results   Component Value Date    BUN 12 08/07/2021    CREATININE 0 79 08/07/2021    CALCIUM 8 8 08/07/2021    K 4 2 08/07/2021    CO2 29 08/07/2021     (H) 08/07/2021     Lab Results   Component Value Date    ALT 57 08/07/2021    AST 22 08/07/2021    ALKPHOS 43 (L) 08/07/2021       Lab Results   Component Value Date    WBC 5 17 08/07/2021    HGB 15 4 08/07/2021    HCT 46 0 08/07/2021    MCV 86 08/07/2021     08/07/2021       No results found for: TSH    No results found for: CHOL  No results found for: TRIG  No results found for: HDL  No results found for: LDLCALC  No results found for: HGBA1C    Results for orders placed or performed during the hospital encounter of 02/15/22   Tissue Exam   Result Value Ref Range    Case Report       Surgical Pathology Report                         Case: [id number]                                   Authorizing Provider:  Mel Sofia MD       Collected:           02/15/2022 1032              Ordering Location:     [facility]   Received:            02/15/2022 [facility]                                                                       Pathologist:           David Vale MD                                                         Specimens:   A) - Duodenum, Bx Duodenum, r/o Celiac                                                              B) - Stomach, Gastric Bx, r/o h  pylori                                                             C) - Terminal Ileum, Bx Terminal Ileum, r/o Colitis                                                 D) - Large Intestine, Right/Ascending Colon, Bx Ascending Colon, r/o Microscopic                    Colitis                                                                                              E) - Large Intestine, Left/Descending Colon, Bx Descending Colon, r/o Microscopic                   Colitis                                                                                    Final Diagnosis       A  Duodenum (biopsy):  - Duodenal mucosa with no diagnostic abnormality  - No Marsh lesion  - Negative for dysplasia     B  Stomach (biopsy):  - Chronic inactive antral gastritis  - No H pylori identified on immunohistochemistry stain   - No intestinal metaplasia     Comment:  While no H pylori are identified on immunohistochemistry stain the overall features are suspicious for an H pylori infection  H pylori may not be detected secondary to recent antibiotic exposure or use of proton pump inhibitors, as well as the presence of intestinal metaplasia  Fecal antigen testing may be considered if clinically indicated  - CK AE1/3 highlights crypts  C  Terminal ileum (biopsy):  - Small bowel mucosa with reactive lymphoid aggregates  - No active enteritis   - Negative for dysplasia     D  Ascending colon (biopsy):  - Colonic mucosa with no diagnostic abnormality  - No active or microscopic colitis  - Negative for dysplasia     E  Descending colon (biopsy):  - Colonic mucosa with no diagnostic abnormality  - No active or microscopic colitis  - Negative for dysplasia         Additional Information       All reported additional testing was performed with appropriately reactive controls  These tests were developed and their performance characteristics determined by [facility] or appropriate performing facility, though some tests may be performed on tissues which have not been validated for performance characteristics (such as staining performed on alcohol exposed cell blocks and decalcified tissues)    Results should be interpreted with caution and in the context of the patients’ clinical condition  These tests may not be cleared or approved by the [place]  Food and Drug Administration, though the FDA has determined that such clearance or approval is not necessary  These tests are used for clinical purposes and they should not be regarded as investigational or for research  This laboratory has been approved by CLIA 88, designated as a high-complexity laboratory and is qualified to perform these tests  Estuardo Khalil Description          A  The specimen is received in formalin, labeled with the patient's name and hospital number, and is designated "biopsy duodenum R/O celiac  ”  It consists of multiple 0 2-0 3 cm pieces of soft, tan-pink tissue  Entirely submitted  Screened cassette  B  The specimen is received in formalin, labeled with the patient's name and hospital number, and is designated "gastric biopsy R/O H pylori  ”  It consists of two 0 3-0 4 cm pieces of soft, tan-pink tissue  Entirely submitted  Screened cassette  C  The specimen is received in formalin, labeled with the patient's name and hospital number, and is designated "biopsy terminal ileum R/O colitis  ”  It consists of multiple 0 1-0 4 cm pieces of soft, tan tissue  Entirely submitted  Screened cassette  D  The specimen is received in formalin, labeled with the patient's name and hospital number, and is designated "biopsy ascending colon R/O microscopic colitis  ”  It consists of a single 0 4 cm soft, tan tissue  Entirely submitted  Screened cassette  E  The specimen is received in formalin, labeled with the patient's name and hospital number, and is designated "biopsy descending colon R/O microscopic colitis  ”  It consists of a single 0 6 cm soft, pink-red tissue  Entirely submitted  Screened cassette      Note: The estimated total formalin fixation time based upon information provided by the submitting clinician and the standard processing schedule is under 72 hours  Marvel           Clinical Information       FINDINGS:  Performed multiple biopsies in the ascending colon and descending colon  Multiple biopsies obtained to assess for microscopic colitis   Colonic   mucosa appeared unremarkable  Multiple small, superficial, round ulcers in the terminal ileum; performed   cold forceps biopsy  Biopsies obtained to assess for Crohn's disease,   other possibility includes infectious process versus NSAID induced ulcers  Small, internal hemorrhoids    FINDINGS:  Regular Z-line 40 cm from the incisors  Mild, localized erythematous mucosa in the prepyloric region; performed   cold forceps biopsy to rule out H  pylori  Retroflexed view was   unremarkable   Pylorus was patent  The duodenal bulb, 1st part of the duodenum and 2nd part of the duodenum   appeared normal  Performed random biopsy  Orders Placed This Encounter   Procedures   • XR knee 3 vw right non injury   • XR knee 3 vw left non injury   • CBC   • Comprehensive metabolic panel   • Lipid panel   • TSH, 3rd generation       ALLERGIES:  No Known Allergies    Current Medications     Current Outpatient Medications   Medication Sig Dispense Refill   • clotrimazole-betamethasone (LOTRISONE) 1-0 05 % cream Apply topically 2 (two) times a day 30 g 0   • dicyclomine (BENTYL) 20 mg tablet Take 1 tablet (20 mg total) by mouth every 6 (six) hours as needed (cramping, diarrhea) 120 tablet 1   • hydrocortisone (ANUSOL-HC) 2 5 % rectal cream Apply topically 2 (two) times a day 28 g 1   • meloxicam (Mobic) 15 mg tablet Take 1 tablet (15 mg total) by mouth daily 15 tablet 1   • pantoprazole (PROTONIX) 40 mg tablet Take 1 tablet (40 mg total) by mouth daily 90 tablet 3   • Probiotic Product (PROBIOTIC ADVANCED PO) Take by mouth (Patient not taking: Reported on 11/1/2022)       No current facility-administered medications for this visit           Health Maintenance     Health Maintenance   Topic Date Due   • Hepatitis C Screening  Never done   • Pneumococcal Vaccine: Pediatrics (0 to 5 Years) and At-Risk Patients (6 to 59 Years) (1 - PCV) Never done   • HIV Screening  Never done   • Annual Physical  Never done   • DTaP,Tdap,and Td Vaccines (1 - Tdap) Never done   • COVID-19 Vaccine (3 - Booster for Pfizer series) 07/24/2022   • BMI: Followup Plan  08/04/2022   • Influenza Vaccine (1) 09/01/2022   • Depression Screening  11/01/2023   • BMI: Adult  11/01/2023   • HIB Vaccine  Aged Out   • Hepatitis B Vaccine  Aged Out   • IPV Vaccine  Aged Out   • Hepatitis A Vaccine  Aged Out   • Meningococcal ACWY Vaccine  Aged Out   • HPV Vaccine  Aged Out     Immunization History   Administered Date(s) Administered   • COVID-19 PFIZER VACCINE 0 3 ML IM 12/26/2021   • COVID-19 Pfizer vac (Leandro-sucrose, gray cap) 12 yr+ IM 02/24/2022   • INFLUENZA 74/62/6942       Ashely Rosario I spent 40 minutes with this patient with greater than 50% was spent counseling are reviewing chart

## 2022-11-02 PROBLEM — R53.83 FATIGUE: Status: ACTIVE | Noted: 2022-11-02

## 2022-11-02 NOTE — ASSESSMENT & PLAN NOTE
Hemorrhoid  Patient with small hemorrhoidal tissue along perirectal area  He was given prescription for Anusol cream to apply twice daily for 7 days  Patient will call if symptoms persist with bleeding

## 2022-11-02 NOTE — ASSESSMENT & PLAN NOTE
Fatigue  Patient will obtain blood work as ordered for evaluation of his symptoms of fatigue, decreased concentration, and memory lapses    We will make further recommendations pending results of test   If all testing within normal limits we will consider further evaluation such as CT scan or neuro psychiatric testing

## 2022-11-02 NOTE — ASSESSMENT & PLAN NOTE
Knee pain  Patient given prescription for meloxicam 15 mg take 1 p o  q day for a minimum of 2 weeks    If symptoms recur or persist he was given a prescription to obtain x-ray of knee for further evaluation

## 2022-11-02 NOTE — ASSESSMENT & PLAN NOTE
Dyspepsia    Patient was given a refill on his pantoprazole and medication which he takes daily as per GI

## 2022-11-02 NOTE — ASSESSMENT & PLAN NOTE
Abdominal cramping    Patient was given a refill on his dicyclomine which he takes q 6 hours p r n  for intermittent abdominal cramps

## 2022-11-02 NOTE — ASSESSMENT & PLAN NOTE
Tinea corporis  Patient given prescription for Lotrisone cream to apply twice daily to the affected area up to 2 weeks    If symptoms persist without improvement he will call the office for further evaluation

## 2022-12-10 ENCOUNTER — APPOINTMENT (OUTPATIENT)
Dept: LAB | Facility: CLINIC | Age: 37
End: 2022-12-10

## 2022-12-10 DIAGNOSIS — Z00.00 PERIODIC HEALTH ASSESSMENT, GENERAL SCREENING, ADULT: ICD-10-CM

## 2022-12-10 LAB
ALBUMIN SERPL BCP-MCNC: 3.8 G/DL (ref 3.5–5)
ALP SERPL-CCNC: 46 U/L (ref 46–116)
ALT SERPL W P-5'-P-CCNC: 84 U/L (ref 12–78)
ANION GAP SERPL CALCULATED.3IONS-SCNC: 5 MMOL/L (ref 4–13)
AST SERPL W P-5'-P-CCNC: 41 U/L (ref 5–45)
BILIRUB SERPL-MCNC: 0.69 MG/DL (ref 0.2–1)
BUN SERPL-MCNC: 14 MG/DL (ref 5–25)
CALCIUM SERPL-MCNC: 9.1 MG/DL (ref 8.3–10.1)
CHLORIDE SERPL-SCNC: 109 MMOL/L (ref 96–108)
CHOLEST SERPL-MCNC: 172 MG/DL
CO2 SERPL-SCNC: 27 MMOL/L (ref 21–32)
CREAT SERPL-MCNC: 0.79 MG/DL (ref 0.6–1.3)
ERYTHROCYTE [DISTWIDTH] IN BLOOD BY AUTOMATED COUNT: 14.5 % (ref 11.6–15.1)
GFR SERPL CREATININE-BSD FRML MDRD: 113 ML/MIN/1.73SQ M
GLUCOSE P FAST SERPL-MCNC: 98 MG/DL (ref 65–99)
HCT VFR BLD AUTO: 46.1 % (ref 36.5–49.3)
HDLC SERPL-MCNC: 38 MG/DL
HGB BLD-MCNC: 15.1 G/DL (ref 12–17)
LDLC SERPL CALC-MCNC: 106 MG/DL (ref 0–100)
MCH RBC QN AUTO: 28.2 PG (ref 26.8–34.3)
MCHC RBC AUTO-ENTMCNC: 32.8 G/DL (ref 31.4–37.4)
MCV RBC AUTO: 86 FL (ref 82–98)
NONHDLC SERPL-MCNC: 134 MG/DL
PLATELET # BLD AUTO: 242 THOUSANDS/UL (ref 149–390)
PMV BLD AUTO: 9.9 FL (ref 8.9–12.7)
POTASSIUM SERPL-SCNC: 4.2 MMOL/L (ref 3.5–5.3)
PROT SERPL-MCNC: 7.4 G/DL (ref 6.4–8.4)
RBC # BLD AUTO: 5.36 MILLION/UL (ref 3.88–5.62)
SODIUM SERPL-SCNC: 141 MMOL/L (ref 135–147)
TRIGL SERPL-MCNC: 139 MG/DL
TSH SERPL DL<=0.05 MIU/L-ACNC: 0.59 UIU/ML (ref 0.45–4.5)
WBC # BLD AUTO: 4.37 THOUSAND/UL (ref 4.31–10.16)

## 2022-12-14 DIAGNOSIS — R74.8 ELEVATED LIVER ENZYMES: Primary | ICD-10-CM

## 2022-12-31 PROBLEM — Z00.00 PERIODIC HEALTH ASSESSMENT, GENERAL SCREENING, ADULT: Status: RESOLVED | Noted: 2022-11-01 | Resolved: 2022-12-31

## 2023-01-28 ENCOUNTER — APPOINTMENT (OUTPATIENT)
Dept: LAB | Facility: CLINIC | Age: 38
End: 2023-01-28

## 2023-01-28 DIAGNOSIS — R74.8 ELEVATED LIVER ENZYMES: ICD-10-CM

## 2023-01-28 LAB
ALBUMIN SERPL BCP-MCNC: 4.2 G/DL (ref 3.5–5)
ALP SERPL-CCNC: 43 U/L (ref 46–116)
ALT SERPL W P-5'-P-CCNC: 100 U/L (ref 12–78)
AST SERPL W P-5'-P-CCNC: 60 U/L (ref 5–45)
BILIRUB DIRECT SERPL-MCNC: 0.11 MG/DL (ref 0–0.2)
BILIRUB SERPL-MCNC: 0.87 MG/DL (ref 0.2–1)
PROT SERPL-MCNC: 7.5 G/DL (ref 6.4–8.4)

## 2023-01-30 ENCOUNTER — TELEPHONE (OUTPATIENT)
Dept: FAMILY MEDICINE CLINIC | Facility: CLINIC | Age: 38
End: 2023-01-30

## 2023-01-30 DIAGNOSIS — R74.8 ELEVATED LIVER ENZYMES: Primary | ICD-10-CM

## 2023-01-30 NOTE — TELEPHONE ENCOUNTER
----- Message from Yuniel Arriaga MD sent at 5/35/5032  6:12 AM EST -----  Recent blood work shows slight elevation in liver function test   I would recommend obtaining an abdominal ultrasound for further evaluation of his liver    I will place order in [facility] and patient may call central scheduling to arrange appointment

## 2023-02-07 ENCOUNTER — HOSPITAL ENCOUNTER (OUTPATIENT)
Dept: ULTRASOUND IMAGING | Facility: HOSPITAL | Age: 38
Discharge: HOME | End: 2023-02-07

## 2023-02-07 DIAGNOSIS — R74.8 ELEVATED LIVER ENZYMES: ICD-10-CM

## 2023-02-14 DIAGNOSIS — E88.89 STEATOSIS (HCC): Primary | ICD-10-CM

## 2023-02-15 ENCOUNTER — OFFICE VISIT (OUTPATIENT)
Dept: GASTROENTEROLOGY | Facility: CLINIC | Age: 38
End: 2023-02-15

## 2023-02-15 VITALS
HEART RATE: 99 BPM | HEIGHT: 73 IN | BODY MASS INDEX: 34.91 KG/M2 | DIASTOLIC BLOOD PRESSURE: 78 MMHG | SYSTOLIC BLOOD PRESSURE: 106 MMHG | WEIGHT: 263.4 LBS

## 2023-02-15 DIAGNOSIS — R79.89 ELEVATED LFTS: ICD-10-CM

## 2023-02-15 DIAGNOSIS — K76.0 FATTY LIVER: ICD-10-CM

## 2023-02-15 DIAGNOSIS — K21.9 GASTROESOPHAGEAL REFLUX DISEASE WITHOUT ESOPHAGITIS: ICD-10-CM

## 2023-02-15 DIAGNOSIS — K64.9 HEMORRHOIDS, UNSPECIFIED HEMORRHOID TYPE: ICD-10-CM

## 2023-02-15 DIAGNOSIS — K58.0 IRRITABLE BOWEL SYNDROME WITH DIARRHEA: Primary | ICD-10-CM

## 2023-02-15 PROBLEM — E88.89 STEATOSIS (HCC): Status: ACTIVE | Noted: 2023-02-15

## 2023-02-15 RX ORDER — DICYCLOMINE HCL 20 MG
20 TABLET ORAL 2 TIMES DAILY
Qty: 60 TABLET | Refills: 5 | Status: SHIPPED | OUTPATIENT
Start: 2023-02-15 | End: 2023-03-17

## 2023-02-15 NOTE — PROGRESS NOTES
[facility] - Outpatient Consultation  Dwight Whitfield 42 y o  male MRN: [id number]  Encounter: [id number]          ASSESSMENT AND PLAN:      1  Irritable bowel syndrome with diarrhea  Patient has history of IBS-diarrhea   Patient takes dicyclomine as needed but not on daily basis  Currently having bowel movements 4 times a day which are loose to liquid in nature and not associated with blood or abdominal pain  - dicyclomine (BENTYL) 20 mg tablet; Take 1 tablet (20 mg total) by mouth 2 (two) times a day  Dispense: 60 tablet; Refill: 5  - rifaximin (XIFAXAN) 550 mg tablet; Take 1 tablet (550 mg total) by mouth every 8 (eight) hours for 14 days  Dispense: 42 tablet; Refill: 0  -High-fiber diet   -Start fiber supplement daily    2  Fatty liver  3  Elevated LFTs  US abdomen done 2/7/2023 showed  enlarged liver with diffuse fatty infiltration  Patient drinks alcohol on rare occassions and denies any history of binge drinking  LFT elevated on 1/28/2023 AST 43, ALT 60, ALK phos 43 and decreased  Patient reported recent antibiotic use which may have contribute to elevated LFT  - Ambulatory Referral to Gastroenterology  - Hepatic function panel; Future  -If LFT remain elevated will do further serology for  further evaluation of underlying liver disease    -Healthy eating and exercise to lose weight   -Avoid all alcohol use  4  GERD  Patient has history of GERD  Patient reports GERD symptoms well controlled with medication   -Continue antireflux diet and measures  -Continue  Pantoprazole    5  Hemorrhoid   patient reports small amount of blood on rare episodes when he rides rectal area  Patient had recent colonoscopy which did show internal hemorrhoids  Blood from rectal area most likely from hemorrhoids   -Continue Anusol cream as ordered by PCP      Follow up 4 months     ______________________________________________________________________    HPI:  This is a 42year old male who present to office for follow up  Patient has history of GERD  Patient reports GERD symptoms well controlled with medication  Denies nausea, vomiting, acid reflux, and heartburn  Denies abdomen pain  Patient has history of IBS-diarrhea   Patient takes dicyclomine as needed but not on daily basis  Currently having bowel movements 4 times a day which are loose to liquid in nature and not associated with blood or abdominal pain  US abdomen done 2/7/2023 showed  enlarged liver with diffuse fatty infiltration  Patient drinks alcohol on rare occassions and denies any history of binge drinking  LFT elevated on 1/28/2023 AST 43, ALT 60, ALK phos 43 and decreased  PAtient reported recent antibiotic use for URI which may have contribute to elevated LFT  Patient report rare episodes of small amount of blood from rectal area when he wipes from hemorrhoids  EGD done 02/15/2022 showed mild gastritis  Biopsy benign  Colonoscopy done 02/15/2022 diminutive aphthous ulcer noted intermial ileum, biopsy obtained  Small internal hemorrhoids  Normal appearing colonic mucosa  Biopsy negative for microscopic inflammation and Crohn's disease  REVIEW OF SYSTEMS:    CONSTITUTIONAL: Denies any fever, chills, rigors, and weight loss  HEENT: No earache or tinnitus  Denies hearing loss or visual disturbances  CARDIOVASCULAR: No chest pain or palpitations  RESPIRATORY: Denies any cough, hemoptysis, shortness of breath or dyspnea on exertion  GASTROINTESTINAL: As noted in the History of Present Illness  GENITOURINARY: No problems with urination  Denies any hematuria or dysuria  NEUROLOGIC: No dizziness or vertigo, denies headaches  MUSCULOSKELETAL: Denies any muscle or joint pain  SKIN: Denies skin rashes or itching  ENDOCRINE: Denies excessive thirst  Denies intolerance to heat or cold  PSYCHOSOCIAL: Denies depression or anxiety  Denies any recent memory loss         Historical Information   Past Medical History:   Diagnosis Date   • GERD (gastroesophageal reflux disease)      Past Surgical History:   Procedure Laterality Date   • HERNIA REPAIR  2018     Social History   Social History     Substance and Sexual Activity   Alcohol Use Yes    Comment: Rare     Social History     Substance and Sexual Activity   Drug Use Yes   • Types: Marijuana    Comment: pt stated he stopped on 12-     Social History     Tobacco Use   Smoking Status Every Day   • Types: Cigarettes   • Last attempt to quit: 2021   • Years since quittin 7   Smokeless Tobacco Never   Tobacco Comments    vapes also     Family History   Problem Relation Age of Onset   • Hypertension Mother    • Heart disease Mother    • Arthritis Mother    • Alzheimer's disease Mother    • Cancer Father 76        liver   • Diabetes Father        Meds/Allergies       Current Outpatient Medications:   •  clotrimazole-betamethasone (LOTRISONE) 1-0 05 % cream  •  dicyclomine (BENTYL) 20 mg tablet  •  dicyclomine (BENTYL) 20 mg tablet  •  hydrocortisone (ANUSOL-HC) 2 5 % rectal cream  •  meloxicam (Mobic) 15 mg tablet  •  pantoprazole (PROTONIX) 40 mg tablet  •  Probiotic Product (PROBIOTIC ADVANCED PO)  •  rifaximin (XIFAXAN) 550 mg tablet    No Known Allergies        Objective     Blood pressure 106/78, pulse 99, height 6' 1" (1 854 m), weight 119 kg (263 lb 6 4 oz)  Body mass index is 34 75 kg/m²  PHYSICAL EXAM:      General Appearance:   Alert, cooperative, no distress   HEENT:   Normocephalic, atraumatic, anicteric      Neck:  Supple, symmetrical, trachea midline   Lungs:   Clear to auscultation bilaterally; no rales, rhonchi or wheezing; respirations unlabored    Heart[de-identified]   Regular rate and rhythm; no murmur, rub, or gallop     Abdomen:   Soft, non-tender, non-distended; normal bowel sounds; no masses, no organomegaly    Genitalia:   Deferred    Rectal:   Deferred    Extremities:  No cyanosis, clubbing or edema    Pulses:  2+ and symmetric    Skin:  No jaundice, rashes, or lesions    Lymph nodes:  No palpable cervical lymphadenopathy        Lab Results:   No visits with results within 1 Day(s) from this visit  Latest known visit with results is:   Appointment on 01/28/2023   Component Date Value   • Total Bilirubin 01/28/2023 0 87    • Bilirubin, Direct 01/28/2023 0 11    • Alkaline Phosphatase 01/28/2023 43 (L)    • AST 01/28/2023 60 (H)    • ALT 01/28/2023 100 (H)    • Total Protein 01/28/2023 7 5    • Albumin 01/28/2023 4 2          Radiology Results:   US right upper quadrant    Result Date: 2/13/2023  Narrative: RIGHT UPPER QUADRANT ULTRASOUND INDICATION:     R74 8: Abnormal levels of other serum enzymes  COMPARISON:  None TECHNIQUE:   Real-time ultrasound of the right upper quadrant was performed with a curvilinear transducer with both volumetric sweeps and still imaging techniques  FINDINGS: PANCREAS:  Visualized portions of the pancreas are within normal limits  AORTA AND IVC:  Visualized portions are normal for patient age  LIVER: Size:  Enlarged  The liver measures 20 7 cm in the midclavicular line  Contour:  Surface contour is smooth  Parenchyma: There is moderate diffuse increased echogenicity with smooth echotexture and acoustic beam attenuation  Most consistent with moderate hepatic steatosis  No liver mass identified  Limited imaging of the main portal vein shows it to be patent and hepatopetal   BILIARY: No gallbladder findings  No intrahepatic biliary dilatation  CBD measures 4 0 mm  No choledocholithiasis  KIDNEY: Right kidney measures 11 5 x 6 1 x 6 4 cm  Volume 237 1 mL Kidney within normal limits  ASCITES:   None  Impression: Enlarged liver with diffuse fatty infiltration   Workstation performed: [id number]

## 2023-03-13 ENCOUNTER — TELEPHONE (OUTPATIENT)
Dept: GASTROENTEROLOGY | Facility: CLINIC | Age: 38
End: 2023-03-13

## 2023-03-13 DIAGNOSIS — R19.7 DIARRHEA, UNSPECIFIED TYPE: Primary | ICD-10-CM

## 2023-03-13 RX ORDER — CHOLESTYRAMINE 4 G/9G
POWDER, FOR SUSPENSION ORAL
Qty: 67 PACKET | Refills: 2 | Status: SHIPPED | OUTPATIENT
Start: 2023-03-13 | End: 2023-04-19

## 2023-03-13 NOTE — TELEPHONE ENCOUNTER
Called and spoke to patient  Advised that insurance has denied of this medication which I have not seen occur in the past   I would not recommend a TCA or Viberzi at this time  Instead, we will start Questran 1 packet daily and increase to 1 packet twice a day if no improvement in bowel movements    He will decrease Bentyl and only use this as needed for abdominal cramping as he has had no improvement with this daily

## 2023-03-13 NOTE — TELEPHONE ENCOUNTER
Alka'jose fax from [facility] stating Alicia 550mb was denied      Medication will only be covered only if: ALL of the followin) tried/failed a tricyclic antidepressant such as amitripyline     2) Failed or cannot use Viberzi    3) Has a history of or potential for a substance abuse disorder

## 2023-05-15 ENCOUNTER — TELEPHONE (OUTPATIENT)
Dept: GASTROENTEROLOGY | Facility: CLINIC | Age: 38
End: 2023-05-15

## 2023-06-06 DIAGNOSIS — R19.7 DIARRHEA, UNSPECIFIED TYPE: ICD-10-CM

## 2023-06-06 RX ORDER — CHOLESTYRAMINE 4 G/9G
POWDER, FOR SUSPENSION ORAL
Qty: 60 PACKET | Refills: 2 | Status: SHIPPED | OUTPATIENT
Start: 2023-06-06

## 2023-06-27 ENCOUNTER — TELEPHONE (OUTPATIENT)
Dept: GASTROENTEROLOGY | Facility: CLINIC | Age: 38
End: 2023-06-27

## 2023-06-27 NOTE — TELEPHONE ENCOUNTER
Alexy Hu,    I scheduled patient for his 4 month follow up with you for Sept 11   Patient would like to know if you want him to have blood work before he sees you?

## 2023-06-27 NOTE — TELEPHONE ENCOUNTER
Please call patient and tell him to get hepatic function test done prior to follow-up in September  Test for hepatic function panel is already in the [facility]'s system and I gave him the prescription when he saw me in the office last  If his liver enzymes remain elevated at that time he will need further work-up concerning his liver    Thank you

## 2023-06-27 NOTE — TELEPHONE ENCOUNTER
I called and spoke to patient and went over recommendations made by Debra Claire  He verbalized understanding  Ov scheduled

## 2023-08-10 ENCOUNTER — TELEPHONE (OUTPATIENT)
Dept: FAMILY MEDICINE CLINIC | Facility: CLINIC | Age: 38
End: 2023-08-10

## 2023-08-10 DIAGNOSIS — Z11.1 SCREENING-PULMONARY TB: Primary | ICD-10-CM

## 2023-08-10 NOTE — TELEPHONE ENCOUNTER
Spoke with pt, decided he would like Quantiferon tb bloodwork done instead. Are you able to place orders? Can call patient then so he can go to lab.  Thank you

## 2023-08-19 ENCOUNTER — APPOINTMENT (OUTPATIENT)
Dept: LAB | Facility: CLINIC | Age: 38
End: 2023-08-19
Payer: COMMERCIAL

## 2023-08-19 DIAGNOSIS — K76.0 FATTY LIVER: ICD-10-CM

## 2023-08-19 DIAGNOSIS — Z11.1 SCREENING-PULMONARY TB: ICD-10-CM

## 2023-08-19 LAB
ALBUMIN SERPL BCP-MCNC: 4 G/DL (ref 3.5–5)
ALP SERPL-CCNC: 38 U/L (ref 46–116)
ALT SERPL W P-5'-P-CCNC: 57 U/L (ref 12–78)
AST SERPL W P-5'-P-CCNC: 28 U/L (ref 5–45)
BILIRUB DIRECT SERPL-MCNC: 0.2 MG/DL (ref 0–0.2)
BILIRUB SERPL-MCNC: 0.72 MG/DL (ref 0.2–1)
PROT SERPL-MCNC: 7.3 G/DL (ref 6.4–8.4)

## 2023-08-19 PROCEDURE — 36415 COLL VENOUS BLD VENIPUNCTURE: CPT

## 2023-08-19 PROCEDURE — 80076 HEPATIC FUNCTION PANEL: CPT

## 2023-08-19 PROCEDURE — 86480 TB TEST CELL IMMUN MEASURE: CPT

## 2023-08-22 LAB
GAMMA INTERFERON BACKGROUND BLD IA-ACNC: 0.04 IU/ML
M TB IFN-G BLD-IMP: NEGATIVE
M TB IFN-G CD4+ BCKGRND COR BLD-ACNC: -0.01 IU/ML
M TB IFN-G CD4+ BCKGRND COR BLD-ACNC: -0.01 IU/ML
MITOGEN IGNF BCKGRD COR BLD-ACNC: >10 IU/ML

## 2023-08-23 ENCOUNTER — TELEPHONE (OUTPATIENT)
Dept: FAMILY MEDICINE CLINIC | Facility: CLINIC | Age: 38
End: 2023-08-23

## 2023-08-23 NOTE — TELEPHONE ENCOUNTER
----- Message from [address] [place] [address] Pericolatisha Elkins sent at 8/23/2023 11:27 AM EDT -----  Negative quantiferon gold.

## 2023-09-11 ENCOUNTER — OFFICE VISIT (OUTPATIENT)
Dept: GASTROENTEROLOGY | Facility: CLINIC | Age: 38
End: 2023-09-11
Payer: COMMERCIAL

## 2023-09-11 VITALS
WEIGHT: 254 LBS | SYSTOLIC BLOOD PRESSURE: 107 MMHG | BODY MASS INDEX: 33.66 KG/M2 | HEIGHT: 73 IN | DIASTOLIC BLOOD PRESSURE: 81 MMHG | HEART RATE: 78 BPM

## 2023-09-11 DIAGNOSIS — E66.9 OBESITY (BMI 30.0-34.9): Primary | ICD-10-CM

## 2023-09-11 DIAGNOSIS — Z12.11 COLON CANCER SCREENING: ICD-10-CM

## 2023-09-11 DIAGNOSIS — R79.89 ELEVATED LFTS: ICD-10-CM

## 2023-09-11 DIAGNOSIS — K21.9 GASTROESOPHAGEAL REFLUX DISEASE WITHOUT ESOPHAGITIS: ICD-10-CM

## 2023-09-11 DIAGNOSIS — K58.0 IRRITABLE BOWEL SYNDROME WITH DIARRHEA: ICD-10-CM

## 2023-09-11 PROBLEM — E66.811 OBESITY (BMI 30.0-34.9): Status: ACTIVE | Noted: 2023-09-11

## 2023-09-11 PROCEDURE — 99214 OFFICE O/P EST MOD 30 MIN: CPT | Performed by: NURSE PRACTITIONER

## 2023-09-11 NOTE — PROGRESS NOTES
[facility] - Outpatient Follow-up Note  Samuel Andrade 34 y.o. male MRN: [id number]  Encounter: [id number]          ASSESSMENT AND PLAN:      1. Obesity (BMI 30.0-34. 9)  Patient has history of obesity. Patient is inquiring about weight management medications. - Ambulatory Referral to Weight Management; Future    2. Gastroesophageal reflux disease without esophagitis  Patient has a history of GERD. Patient currently denies acid reflux, heartburn, nausea, vomiting, epigastric or abdominal pain. Acid reflux or heartburn. Patient currently not taking pantoprazole. -Continue to follow antireflux diet and measures  -May take famotidine 20 mg as needed    3. Irritable bowel syndrome with diarrhea  Patient has history of irritable bowel syndrome with diarrhea. Colonoscopy was done on 2/15/2022 due to diarrhea Biopsy showed no active or microscopic colitis. Colonic mucosa with no diagnostic abnormality. No active enteritis.    -Patient was previously on Xifaxan with no improvement in symptoms.   -Patient was started on Questran 6/6/2023 but reports he only takes it as needed. Patient instructed to take Questran daily for 2 weeks if no improvement in symptoms increase to 1 packet 2 times a day. 4. Elevated LFTs  Patient has history of elevated LFTs. Elevated LFTs due to fatty liver. Most recent LFTs within normal limits. Previous imaging ultrasound abdomen done 2/7/2023 showed diffuse fatty infiltrates. -Recommend healthy eating and exercise to lose fat from liver  -Avoid all alcohol use    5. Colon cancer screening  Colon cancer screening due at age 35     9. Hemorrhoids  Patient has history of hemorrhoidal bleeding. Patient reports only rare episodes of blood from rectal area. -Continue to use Anusol rectal cream as needed.     Follow up in 6 months    ______________________________________________________________________    SUBJECTIVE:  This is a 18-year-old male who presents to the office for follow-up. Patient has history of GERD, irritable bowel syndrome with diarrhea, hemorrhoids, and elevated LFTs. Patient currently denies GI issues. Patient denies nausea, vomiting, acid reflux, heartburn, epigastric or abdominal pain. Patient is no longer taking dicyclomine and reports he has only been taking Questran as needed. Patient reports that diarrhea has improved he has approximately 2 episodes a week. Patient denies blood in stool or black tarry stool. Patient does have a history of hemorrhoids and will have some rare episodes of small amount of blood from the rectal area when he wipes secondary to hemorrhoidal bleed. LFTs within normal limits except alk phos slightly decreased at 38 on lab work done on 2023. EGD done 02/15/2022 showed mild gastritis. Biopsy benign. Colonoscopy done 02/15/2022 diminutive aphthous ulcer noted intermial ileum, biopsy obtained. Small internal hemorrhoids. Normal appearing colonic mucosa. Biopsy negative for microscopic colitis and Crohn's disease. REVIEW OF SYSTEMS IS OTHERWISE NEGATIVE.       Historical Information   Past Medical History:   Diagnosis Date   • GERD (gastroesophageal reflux disease)      Past Surgical History:   Procedure Laterality Date   • HERNIA REPAIR  2018     Social History   Social History     Substance and Sexual Activity   Alcohol Use Yes    Comment: Rare     Social History     Substance and Sexual Activity   Drug Use Yes   • Types: Marijuana    Comment: pt stated he stopped on 12-     Social History     Tobacco Use   Smoking Status Every Day   • Types: Cigarettes   • Last attempt to quit: 2021   • Years since quittin.3   Smokeless Tobacco Never   Tobacco Comments    vapes also     Family History   Problem Relation Age of Onset   • Hypertension Mother    • Heart disease Mother    • Arthritis Mother    • Alzheimer's disease Mother    • Cancer Father 76        liver   • Diabetes Father Meds/Allergies       Current Outpatient Medications:   •  cholestyramine (QUESTRAN) 4 g packet  •  hydrocortisone (ANUSOL-HC) 2.5 % rectal cream  •  clotrimazole-betamethasone (LOTRISONE) 1-0.05 % cream    No Known Allergies        Objective     Blood pressure 107/81, pulse 78, height 6' 1" (1.854 m), weight 115 kg (254 lb). Body mass index is 33.51 kg/m². PHYSICAL EXAM:      General Appearance:   Alert, cooperative, no distress   HEENT:   Normocephalic, atraumatic, anicteric.     Neck:  Supple, symmetrical, trachea midline   Lungs:   Clear to auscultation bilaterally; no rales, rhonchi or wheezing; respirations unlabored    Heart[de-identified]   Regular rate and rhythm; no murmur, rub, or gallop. Abdomen:   Soft, non-tender, non-distended; normal bowel sounds; no masses, no organomegaly    Genitalia:   Deferred    Rectal:   Deferred    Extremities:  No cyanosis, clubbing or edema    Pulses:  2+ and symmetric    Skin:  No jaundice, rashes, or lesions    Lymph nodes:  No palpable cervical lymphadenopathy        Lab Results:   No visits with results within 1 Day(s) from this visit. Latest known visit with results is:   Appointment on 08/19/2023   Component Date Value   • Total Bilirubin 08/19/2023 0.72    • Bilirubin, Direct 08/19/2023 0.20    • Alkaline Phosphatase 08/19/2023 38 (L)    • AST 08/19/2023 28    • ALT 08/19/2023 57    • Total Protein 08/19/2023 7.3    • Albumin 08/19/2023 4.0    • QFT Nil 08/19/2023 0.04    • QFT TB1-NIL 08/19/2023 -0.01    • QFT TB2-NIL 08/19/2023 -0.01    • QFT Mitogen-NIL 08/19/2023 >10.00    • QFT Final Interpretation 08/19/2023 Negative          Radiology Results:   No results found.

## 2023-09-11 NOTE — PATIENT INSTRUCTIONS
Take famotidine 20 mg (Pepcid) as needed   Take Questran daily no improvement in symptoms in 2 weeks then can increase to twice a day

## 2023-11-10 PROBLEM — Z12.11 COLON CANCER SCREENING: Status: RESOLVED | Noted: 2022-11-01 | Resolved: 2023-11-10

## 2023-12-14 ENCOUNTER — OFFICE VISIT (OUTPATIENT)
Dept: FAMILY MEDICINE CLINIC | Facility: CLINIC | Age: 38
End: 2023-12-14
Payer: COMMERCIAL

## 2023-12-14 VITALS
HEIGHT: 73 IN | HEART RATE: 85 BPM | SYSTOLIC BLOOD PRESSURE: 118 MMHG | OXYGEN SATURATION: 98 % | BODY MASS INDEX: 32.74 KG/M2 | DIASTOLIC BLOOD PRESSURE: 78 MMHG | WEIGHT: 247 LBS | RESPIRATION RATE: 16 BRPM | TEMPERATURE: 98 F

## 2023-12-14 DIAGNOSIS — R68.89 FLU-LIKE SYMPTOMS: Primary | ICD-10-CM

## 2023-12-14 DIAGNOSIS — J10.1 INFLUENZA A: ICD-10-CM

## 2023-12-14 LAB
SARS-COV-2 AG UPPER RESP QL IA: NEGATIVE
SL AMB POCT RAPID FLU A: NEGATIVE
SL AMB POCT RAPID FLU B: NEGATIVE
VALID CONTROL: NORMAL

## 2023-12-14 PROCEDURE — 87804 INFLUENZA ASSAY W/OPTIC: CPT | Performed by: NURSE PRACTITIONER

## 2023-12-14 PROCEDURE — 99213 OFFICE O/P EST LOW 20 MIN: CPT | Performed by: NURSE PRACTITIONER

## 2023-12-14 PROCEDURE — 87811 SARS-COV-2 COVID19 W/OPTIC: CPT | Performed by: NURSE PRACTITIONER

## 2023-12-14 RX ORDER — OSELTAMIVIR PHOSPHATE 75 MG/1
75 CAPSULE ORAL EVERY 12 HOURS SCHEDULED
Qty: 10 CAPSULE | Refills: 0 | Status: SHIPPED | OUTPATIENT
Start: 2023-12-14 | End: 2023-12-19

## 2023-12-14 NOTE — PROGRESS NOTES
FAMILY PRACTICE OFFICE VISIT       NAME: Samuel Andrade  AGE: 34 y.o. SEX: male       : 1985        MRN: [id number]    Assessment and Plan   1. Flu-like symptoms  -     POCT rapid flu A and B  -     Poct Covid 19 Rapid Antigen Test    2. Influenza A  -     oseltamivir (TAMIFLU) 75 mg capsule; Take 1 capsule (75 mg total) by mouth every 12 (twelve) hours for 5 days       COVID-19 swab rapid--negative. Negative rapid influenza A&B swab. He is with his wife today, who has same symptoms and tests positive for influenza A. Given symptoms and household exposure will treat with tamiflu. Did not have a flu vaccine this season. Continue rest, fluids, tylenol, ibuprofen, OTC cold medications as needed. Call for worsening symptoms or symptoms that not improving over the next one week. Chief Complaint     Chief Complaint   Patient presents with    Cold Like Symptoms       History of Present Illness     Flory Valles is a 34year old male presenting today for URI symptoms. Symptoms started yesterday. Congestion  Rhinorrhea  Headaches  Dizzy  Diarrhea  Body aches  Chills    No fever, but taking tylenol for headaches. Review of Systems   Review of Systems   Constitutional:  Positive for chills and fatigue. Negative for fever. HENT:  Positive for congestion, postnasal drip and rhinorrhea. Negative for sore throat. Respiratory:  Negative for cough. Cardiovascular: Negative. Gastrointestinal:  Positive for diarrhea. Negative for nausea and vomiting. Musculoskeletal:  Positive for myalgias. Neurological:  Positive for headaches. I have reviewed the patient's medical history in detail; there are no changes to the history as noted in the electronic medical record.     Objective     Vitals:    23 0909   BP: 118/78   Pulse: 85   Resp: 16   Temp: 98 °F (36.7 °C)   TempSrc: Temporal   SpO2: 98%   Weight: 112 kg (247 lb)   Height: 6' 1" (1.854 m)     Wt Readings from Last 3 Encounters:   12/14/23 112 kg (247 lb)   09/11/23 115 kg (254 lb)   02/15/23 119 kg (263 lb 6.4 oz)     Physical Exam  Vitals and nursing note reviewed. Constitutional:       General: He is not in acute distress. Appearance: Normal appearance. He is not ill-appearing. HENT:      Head: Atraumatic. Right Ear: Tympanic membrane normal.      Left Ear: Tympanic membrane normal.      Mouth/Throat:      Mouth: Mucous membranes are moist.      Pharynx: Oropharynx is clear. Cardiovascular:      Rate and Rhythm: Normal rate and regular rhythm. Heart sounds: No murmur heard. Pulmonary:      Effort: Pulmonary effort is normal.   Neurological:      Mental Status: He is alert. Psychiatric:         Mood and Affect: Mood normal.         Depression Screening and Follow-up Plan: Patient was screened for depression during today's encounter. They screened negative with a PHQ-2 score of 0. ALLERGIES:  No Known Allergies    Current Medications     Current Outpatient Medications   Medication Sig Dispense Refill    cholestyramine (QUESTRAN) 4 g packet TAKE 1 PACKET BY MOUTH DAILY IN A GLASS OF LIQUID  WITH A MEAL FOR 1 WEEK, IF NO IMPROVEMENT IN DIARRHEA, RECOMMEND INCREASING TO 1 PACKET TWICE A DAY WITH MEALS 60 packet 2    hydrocortisone (ANUSOL-HC) 2.5 % rectal cream Apply topically 2 (two) times a day 28 g 1    oseltamivir (TAMIFLU) 75 mg capsule Take 1 capsule (75 mg total) by mouth every 12 (twelve) hours for 5 days 10 capsule 0     No current facility-administered medications for this visit.          Health Maintenance     Health Maintenance   Topic Date Due    Hepatitis C Screening  Never done    Pneumococcal Vaccine: Pediatrics (0 to 5 Years) and At-Risk Patients (6 to 59 Years) (1 - PCV) Never done    HIV Screening  Never done    Annual Physical  Never done    DTaP,Tdap,and Td Vaccines (1 - Tdap) Never done    BMI: Followup Plan  08/04/2022    COVID-19 Vaccine (3 - 2023-24 season) 09/01/2023 Influenza Vaccine (1) 01/14/2024 (Originally 9/1/2023)    BMI: Adult  09/11/2024    Depression Screening  12/14/2024    HIB Vaccine  Aged Out    IPV Vaccine  Aged Out    Hepatitis A Vaccine  Aged Out    Meningococcal ACWY Vaccine  Aged Out    HPV Vaccine  Aged Out     Immunization History   Administered Date(s) Administered    COVID-19 PFIZER VACCINE 0.3 ML IM 12/26/2021    COVID-19 Pfizer vac (Leandro-sucrose, gray cap) 12 yr+ IM 02/24/2022    INFLUENZA 09/08/2020       JOHANN Arrieta

## 2024-01-30 ENCOUNTER — OFFICE VISIT (OUTPATIENT)
Dept: FAMILY MEDICINE CLINIC | Facility: CLINIC | Age: 39
End: 2024-01-30
Payer: COMMERCIAL

## 2024-01-30 VITALS
DIASTOLIC BLOOD PRESSURE: 60 MMHG | SYSTOLIC BLOOD PRESSURE: 90 MMHG | RESPIRATION RATE: 18 BRPM | HEART RATE: 100 BPM | HEIGHT: 73 IN | WEIGHT: 249.13 LBS | OXYGEN SATURATION: 98 % | TEMPERATURE: 98.4 F | BODY MASS INDEX: 33.02 KG/M2

## 2024-01-30 DIAGNOSIS — L02.92 FURUNCLE: Primary | ICD-10-CM

## 2024-01-30 PROCEDURE — 99213 OFFICE O/P EST LOW 20 MIN: CPT | Performed by: FAMILY MEDICINE

## 2024-01-30 RX ORDER — DOXYCYCLINE HYCLATE 100 MG/1
100 CAPSULE ORAL 2 TIMES DAILY
Qty: 20 CAPSULE | Refills: 0 | Status: SHIPPED | OUTPATIENT
Start: 2024-01-30 | End: 2024-02-09

## 2024-01-30 RX ORDER — DOXYCYCLINE HYCLATE 100 MG/1
100 CAPSULE ORAL DAILY
Qty: 90 CAPSULE | Refills: 1 | Status: SHIPPED | OUTPATIENT
Start: 2024-01-30 | End: 2024-04-29

## 2024-01-30 NOTE — ASSESSMENT & PLAN NOTE
Furuncle.  Patient given prescription for doxycycline to use 100 mg twice daily for 10 days.  He may use sitz bath's to help with the healing.  He was also given prescription to continue with doxycycline 100 mg once daily for up to 3 months as a preventative measure to avoid recurrence of new infection.  Patient will call if he has any further questions

## 2024-01-30 NOTE — PROGRESS NOTES
FAMILY PRACTICE OFFICE VISIT       NAME: Samuel Andrade  AGE: 38 y.o. SEX: male       : 1985        MRN: [id number]    DATE: 2024  TIME: 2:40 PM    Assessment and Plan     Problem List Items Addressed This Visit       Furuncle - Primary     Furuncle.  Patient given prescription for doxycycline to use 100 mg twice daily for 10 days.  He may use sitz bath's to help with the healing.  He was also given prescription to continue with doxycycline 100 mg once daily for up to 3 months as a preventative measure to avoid recurrence of new infection.  Patient will call if he has any further questions         Relevant Medications    doxycycline hyclate (VIBRAMYCIN) 100 mg capsule    doxycycline hyclate (VIBRAMYCIN) 100 mg capsule           Chief Complaint     Chief Complaint   Patient presents with    boil in butock     Headache       History of Present Illness     Patient with a long history of having formation of boils and furuncles on various parts of his body's including axilla, groin, and between his buttocks.  Patient has seen dermatologist in the past and prescribed antibacterial washes which have been ineffective at controlling outbreaks of new infections.  Currently patient's latest furuncle is between his buttocks.  It is uncomfortable to sit for any extended periods of time.    Headache      Review of Systems   Review of Systems   Constitutional: Negative.    Skin:         As per HPI       Active Problem List     Patient Active Problem List   Diagnosis    Diarrhea    Abdominal cramping    Dyspepsia    Hemorrhoids    Acute pain of left knee    Tinea    Fatigue    Fatty liver    Irritable bowel syndrome with diarrhea    Elevated LFTs    Gastroesophageal reflux disease without esophagitis    Obesity (BMI 30.0-34.9)    Furuncle       Past Medical History:  Past Medical History:   Diagnosis Date    GERD (gastroesophageal reflux disease)        Past Surgical History:  Past Surgical History:   Procedure  Laterality Date    HERNIA REPAIR  2018       Family History:  Family History   Problem Relation Age of Onset    Hypertension Mother     Heart disease Mother     Arthritis Mother     Alzheimer's disease Mother     Cancer Father 74        liver    Diabetes Father        Social History:  Social History     Socioeconomic History    Marital status: /Civil Union     Spouse name: Not on file    Number of children: Not on file    Years of education: Not on file    Highest education level: Not on file   Occupational History    Not on file   Tobacco Use    Smoking status: Every Day     Current packs/day: 0.00     Types: Cigarettes     Last attempt to quit: 2021     Years since quittin.7    Smokeless tobacco: Never    Tobacco comments:     vapes also   Vaping Use    Vaping status: Some Days    Substances: THC (Edibles), Flavoring   Substance and Sexual Activity    Alcohol use: Yes     Comment: Rare    Drug use: Yes     Types: Marijuana     Comment: pt stated he stopped on 12-    Sexual activity: Yes     Partners: Female   Other Topics Concern    Not on file   Social History Narrative         2 children    Working- construction inspection.     Social Determinants of Health     Financial Resource Strain: Not on file   Food Insecurity: Not on file   Transportation Needs: Not on file   Physical Activity: Not on file   Stress: Not on file   Social Connections: Not on file   Intimate Partner Violence: Not on file   Housing Stability: Not on file       Objective     Vitals:    24 1414   BP: 90/60   Pulse: 100   Resp: 18   Temp: 98.4 °F (36.9 °C)   SpO2: 98%     Wt Readings from Last 3 Encounters:   24 113 kg (249 lb 2 oz)   23 112 kg (247 lb)   23 115 kg (254 lb)       Physical Exam  Constitutional:       General: He is not in acute distress.     Appearance: Normal appearance. He is not ill-appearing.   Skin:     Findings: Lesion present.      Comments: Patient with 1 x 2 cm  "skin colored tender nodule along left inner buttock area.  There is no drainage noted at this time   Neurological:      Mental Status: He is alert.         Pertinent Laboratory/Diagnostic Studies:  Lab Results   Component Value Date    BUN 14 12/10/2022    CREATININE 0.79 12/10/2022    CALCIUM 9.1 12/10/2022    K 4.2 12/10/2022    CO2 27 12/10/2022     (H) 12/10/2022     Lab Results   Component Value Date    ALT 57 08/19/2023    AST 28 08/19/2023    ALKPHOS 38 (L) 08/19/2023       Lab Results   Component Value Date    WBC 4.37 12/10/2022    HGB 15.1 12/10/2022    HCT 46.1 12/10/2022    MCV 86 12/10/2022     12/10/2022       No results found for: \"TSH\"    No results found for: \"CHOL\"  Lab Results   Component Value Date    TRIG 139 12/10/2022     Lab Results   Component Value Date    HDL 38 (L) 12/10/2022     Lab Results   Component Value Date    LDLCALC 106 (H) 12/10/2022     No results found for: \"HGBA1C\"    Results for orders placed or performed in visit on 12/14/23   POCT rapid flu A and B   Result Value Ref Range    RAPID FLU A Negative     RAPID FLU B Negative    Poct Covid 19 Rapid Antigen Test   Result Value Ref Range    POCT SARS-CoV-2 Ag Negative Negative    VALID CONTROL Valid        No orders of the defined types were placed in this encounter.      ALLERGIES:  No Known Allergies    Current Medications     Current Outpatient Medications   Medication Sig Dispense Refill    cholestyramine (QUESTRAN) 4 g packet TAKE 1 PACKET BY MOUTH DAILY IN A GLASS OF LIQUID  WITH A MEAL FOR 1 WEEK, IF NO IMPROVEMENT IN DIARRHEA, RECOMMEND INCREASING TO 1 PACKET TWICE A DAY WITH MEALS 60 packet 2    doxycycline hyclate (VIBRAMYCIN) 100 mg capsule Take 1 capsule (100 mg total) by mouth 2 (two) times a day for 10 days 20 capsule 0    doxycycline hyclate (VIBRAMYCIN) 100 mg capsule Take 1 capsule (100 mg total) by mouth in the morning 90 capsule 1    hydrocortisone (ANUSOL-HC) 2.5 % rectal cream Apply topically 2 " (two) times a day (Patient not taking: Reported on 1/30/2024) 28 g 1     No current facility-administered medications for this visit.         Health Maintenance     Health Maintenance   Topic Date Due    Hepatitis C Screening  Never done    Pneumococcal Vaccine: Pediatrics (0 to 5 Years) and At-Risk Patients (6 to 64 Years) (1 - PCV) Never done    HIV Screening  Never done    Annual Physical  Never done    DTaP,Tdap,and Td Vaccines (1 - Tdap) Never done    COVID-19 Vaccine (3 - 2023-24 season) 09/01/2023    Influenza Vaccine (1) 06/30/2024 (Originally 9/1/2023)    Depression Screening  12/14/2024    Zoster Vaccine (1 of 2) 09/05/2033    HIB Vaccine  Aged Out    IPV Vaccine  Aged Out    Hepatitis A Vaccine  Aged Out    Meningococcal ACWY Vaccine  Aged Out    HPV Vaccine  Aged Out     Immunization History   Administered Date(s) Administered    COVID-19 PFIZER VACCINE 0.3 ML IM 12/26/2021    COVID-19 Pfizer vac (Leandro-sucrose, gray cap) 12 yr+ IM 02/24/2022    INFLUENZA 09/08/2020       David Ventura MD

## 2024-02-06 ENCOUNTER — OFFICE VISIT (OUTPATIENT)
Dept: BARIATRICS | Facility: CLINIC | Age: 39
End: 2024-02-06
Payer: COMMERCIAL

## 2024-02-06 VITALS
SYSTOLIC BLOOD PRESSURE: 99 MMHG | HEART RATE: 88 BPM | RESPIRATION RATE: 16 BRPM | HEIGHT: 71 IN | BODY MASS INDEX: 35.81 KG/M2 | DIASTOLIC BLOOD PRESSURE: 65 MMHG | WEIGHT: 255.8 LBS

## 2024-02-06 DIAGNOSIS — E66.9 OBESITY, CLASS II, BMI 35-39.9: Primary | ICD-10-CM

## 2024-02-06 DIAGNOSIS — K76.0 FATTY LIVER: ICD-10-CM

## 2024-02-06 DIAGNOSIS — K58.0 IRRITABLE BOWEL SYNDROME WITH DIARRHEA: ICD-10-CM

## 2024-02-06 PROBLEM — E66.811 OBESITY (BMI 30.0-34.9): Status: RESOLVED | Noted: 2023-09-11 | Resolved: 2024-02-06

## 2024-02-06 PROBLEM — E66.812 OBESITY, CLASS II, BMI 35-39.9: Status: ACTIVE | Noted: 2023-09-11

## 2024-02-06 PROCEDURE — 99214 OFFICE O/P EST MOD 30 MIN: CPT | Performed by: INTERNAL MEDICINE

## 2024-02-06 RX ORDER — BUPROPION HYDROCHLORIDE 150 MG/1
150 TABLET ORAL DAILY
Qty: 90 TABLET | Refills: 0 | Status: SHIPPED | OUTPATIENT
Start: 2024-02-06

## 2024-02-06 RX ORDER — NALTREXONE HYDROCHLORIDE 50 MG/1
25 TABLET, FILM COATED ORAL DAILY
Qty: 45 TABLET | Refills: 0 | Status: SHIPPED | OUTPATIENT
Start: 2024-02-06

## 2024-02-06 NOTE — PATIENT INSTRUCTIONS
"General Recommendations:  Nutrition:  Eat breakfast daily.  Do not skip meals.     Food log (ie.) www.[employer].com, sparkpeople.com, loseit.com, calorieking.com, etc.    Practice mindful eating.  Be sure to set aside time to eat, eat slowly, and savor your food.    Hydration:    At least 64oz of water daily.  No sugar sweetened beverages.  No juice (eat the fruit instead).    Exercise:  Studies have shown that the ideal exercise goal is somewhere between 150 to 300 minutes of moderate intensity exercise a week.  Start with exercising 10 minutes every other day and gradually increase physical activity with a goal of at least 150 minutes of moderate intensity exercise a week, divided over at least 3 days a week.  An example of this would be exercising 30 minutes a day, 5 days a week.  Resistance training can increase muscle mass and increase our resting metabolic rate.   FULL BODY resistance training is recommended 2-3 times a week.  Do not do this on consecutive days to allow for muscle recovery.    Aim for a bare minimum 5000 steps, even on days you do not exercise.    Monitoring:   Weigh yourself daily.  If this causes undue stress, then just weigh yourself once a week.  Weigh yourself the same time of the day with the same amount of clothing on.  Preferably this should be done after waking up, before you eat, and with no clothing or minimal clothing on.    Specific Goals:  Food log (ie.) www.[employer].com,sparkpeople.com,loseit.com,calorieking.com,etc.     No sugary beverages. At least 64oz of water daily.    Take buproprion xl 150mg daily and naltrexone 25mg (1/2 tablet) daily.  You are beng started on \"contrave\" or on the generic versions of its components at approximated dosages.  If you develop abdominal upset, headache, dizziness, trouble sleeping, increased blood pressure, depression, anxiety, and fatigue, then you must contact the office right away.  If you develop thoughts of harming yourself or " others then stop the medication right away and go to the Emergency Room.     Calorie goal:  0100-2133 calories a day (Provided with meal plan to follow).    Nurse visit in 6 week.    Return visit:  3 months

## 2024-02-06 NOTE — PROGRESS NOTES
Assessment/Plan:  Samuel was seen today for consult.    Diagnoses and all orders for this visit:    Obesity, Class II, BMI 35-39.9  -     Ambulatory Referral to Weight Management  -     buPROPion (Wellbutrin XL) 150 mg 24 hr tablet; Take 1 tablet (150 mg total) by mouth daily  -     naltrexone (REVIA) 50 mg tablet; Take 0.5 tablets (25 mg total) by mouth daily    Fatty liver    Irritable bowel syndrome with diarrhea    - Discussed options of HealthyCORE-Intensive Lifestyle Intervention Program, Very Low Calorie Diet-VLCD, and Conservative Program and the role of weight loss medications.  - Explained the importance of making lifestyle changes first before starting anti-obesity medications.  - Patient should demonstrate lifestyle changes.  He is not interested in a GLP-1 or GLP-1/GIP medication at this time.  We discussed other options including Qsymia, Contrave, and phentermine.  At this time he is most interested in starting contrave.  Unfortunately Contrave is not covered by his insurance.  He is agreeable to off label prescribing of the generic components.   - Initial weight loss goal of 5-10% weight loss for improved health as studies have shown this is where we see the greatest impact on improving health and decreasing risk of obesity related conditions.  - Weight loss can improve patient's co-morbid conditions and/or prevent weight-related complications.  - Stop Bang 3/8  - Labs reviewed: As below.      General Recommendations:  Nutrition:  Eat breakfast daily.  Do not skip meals.     Food log (ie.) www.[employer].com, sparkpeople.com, loseit.com, [place] [employer].com, etc.    Practice mindful eating.  Be sure to set aside time to eat, eat slowly, and savor your food.    Hydration:    At least 64oz of water daily.  No sugar sweetened beverages.  No juice (eat the fruit instead).    Exercise:  Studies have shown that the ideal exercise goal is somewhere between 150 to 300 minutes of moderate intensity exercise a  "week.  Start with exercising 10 minutes every other day and gradually increase physical activity with a goal of at least 150 minutes of moderate intensity exercise a week, divided over at least 3 days a week.  An example of this would be exercising 30 minutes a day, 5 days a week.  Resistance training can increase muscle mass and increase our resting metabolic rate.   FULL BODY resistance training is recommended 2-3 times a week.  Do not do this on consecutive days to allow for muscle recovery.    Aim for a bare minimum 5000 steps, even on days you do not exercise.    Monitoring:   Weigh yourself daily.  If this causes undue stress, then just weigh yourself once a week.  Weigh yourself the same time of the day with the same amount of clothing on.  Preferably this should be done after waking up, before you eat, and with no clothing or minimal clothing on.    Specific Goals:  Food log (ie.) www."[employer].com,sparkpeople.com,loseit.com,[employer].com,etc.     No sugary beverages. At least 64oz of water daily.    Take buproprion xl 150mg daily and naltrexone 25mg (1/2 tablet) daily.  You are beng started on \"contrave\" or on the generic versions of its components at approximated dosages.  If you develop abdominal upset, headache, dizziness, trouble sleeping, increased blood pressure, depression, anxiety, and fatigue, then you must contact the office right away.  If you develop thoughts of harming yourself or others then stop the medication right away and go to the Emergency Room.     Calorie goal:  8983-4191 calories a day (Provided with meal plan to follow).    Nurse visit in 6 week.    Return visit:  3 months       Total time spent reviewing chart, interviewing patient, examining patient, discussing plan, answering all questions, and documentin min.       ______________________________________________________________________        Subjective:   Chief Complaint   Patient presents with    Consult     MWM " "Consult;Gw-200lb ; Waist-47in ; Stop bang-3/8       HPI: Samuel Andrade  is a 38 y.o. male with history of MIJARES, IBS, and excess weight, here to pursue weight loss management.  Previous notes and records have been reviewed.    HPI  Wt Readings from Last 20 Encounters:   02/06/24 116 kg (255 lb 12.8 oz)   01/30/24 113 kg (249 lb 2 oz)   12/14/23 112 kg (247 lb)   09/11/23 115 kg (254 lb)   02/15/23 119 kg (263 lb 6.4 oz)   11/01/22 119 kg (262 lb 4 oz)   02/15/22 117 kg (258 lb)   12/23/21 117 kg (258 lb 6.4 oz)   08/04/21 114 kg (251 lb 9.6 oz)     Excess Weight:  Onset:  \"a very long time\"    Highest weight: 265  Current weight: 255  What has been tried: Diet and Exercise  \"Worked but then you start getting hungry.\"  Typically fasted for one day then cut down sugar/carbs and late eating.    Contributing factors: Poor Food Choices  Associated symptoms and effects: comorbid conditions (MIJARES)    Dining out:  2 times a week  Hydration:  Water 64-90oz of water    Soda 3-4 a day in the past.  Now 2 a week.    Juice  Alcohol:  No  Smoking:  Vape  Exercise:  No  Weight Monitoring:  Not often  Sleep:  <6 hours  STOP-BANG Score:  3/8  Occupation:  (construction inspection - sedentary)  Starting a new job on Monday where he will be more busy and moving more.        Past Medical History:   Diagnosis Date    GERD (gastroesophageal reflux disease)      Patient denies personal and family history of pancreatitis, thyroid cancer, MEN-2 tumors.  Denies any hx of glaucoma, seizures, kidney stones, gallstones.  Denies Hx of CAD, PAD, palpitations, arrhythmia.   Denies uncontrolled anxiety or depression, suicidal behavior or thinking , insomnia or sleep disturbance.   Past Surgical History:   Procedure Laterality Date    HERNIA REPAIR  12/01/2018     The following portions of the patient's history were reviewed and updated as appropriate: allergies, current medications, past family history, past medical history, past social " "history, past surgical history, and problem list.    Review Of Systems:  Review of Systems   Constitutional:  Negative for activity change, appetite change, chills, fatigue and fever.   HENT:  Negative for trouble swallowing.    Respiratory:  Negative for cough and shortness of breath.    Cardiovascular:  Negative for chest pain, palpitations and leg swelling.   Gastrointestinal:  Negative for abdominal pain, constipation, diarrhea, nausea and vomiting.   Endocrine: Negative for cold intolerance and heat intolerance.   Genitourinary:  Negative for difficulty urinating and dysuria.   Musculoskeletal:  Negative for arthralgias, back pain, gait problem and myalgias.   Skin:  Negative for pallor and rash.   Neurological:  Negative for headaches.   Psychiatric/Behavioral:  Negative for dysphoric mood and suicidal ideas. The patient is not nervous/anxious.        Objective:  BP 99/65   Pulse 88   Resp 16   Ht 5' 11\" (1.803 m)   Wt 116 kg (255 lb 12.8 oz)   BMI 35.68 kg/m²   Physical Exam  Vitals and nursing note reviewed.   Constitutional:       General: He is not in acute distress.     Appearance: Normal appearance. He is not ill-appearing or diaphoretic.   Eyes:      General: No scleral icterus.  Cardiovascular:      Rate and Rhythm: Normal rate and regular rhythm.      Pulses: Normal pulses.      Heart sounds: Normal heart sounds. No murmur heard.  Pulmonary:      Effort: Pulmonary effort is normal. No respiratory distress.      Breath sounds: Normal breath sounds. No stridor. No wheezing or rhonchi.   Abdominal:      General: Bowel sounds are normal. There is no distension.      Palpations: Abdomen is soft. There is no mass.      Tenderness: There is no abdominal tenderness.   Musculoskeletal:      Cervical back: Neck supple.      Right lower leg: No edema.      Left lower leg: No edema.   Lymphadenopathy:      Cervical: No cervical adenopathy.   Skin:     Capillary Refill: Capillary refill takes less than 2 " "seconds.      Findings: No lesion or rash.   Neurological:      Mental Status: He is alert and oriented to person, place, and time.      Gait: Gait normal.   Psychiatric:         Mood and Affect: Mood normal.         Behavior: Behavior normal.         Labs and Imaging  Recent labs and imaging have been personally reviewed.  Lab Results   Component Value Date    WBC 4.37 12/10/2022    HGB 15.1 12/10/2022    HCT 46.1 12/10/2022    MCV 86 12/10/2022     12/10/2022     Lab Results   Component Value Date    SODIUM 141 12/10/2022    K 4.2 12/10/2022     (H) 12/10/2022    CO2 27 12/10/2022    AGAP 5 12/10/2022    BUN 14 12/10/2022    CREATININE 0.79 12/10/2022    GLUF 98 12/10/2022    CALCIUM 9.1 12/10/2022    AST 28 08/19/2023    ALT 57 08/19/2023    ALKPHOS 38 (L) 08/19/2023    TP 7.3 08/19/2023    TBILI 0.72 08/19/2023    EGFR 113 12/10/2022     No results found for: \"HGBA1C\"  Lab Results   Component Value Date    MOX2OVJPAFJF 0.592 12/10/2022     Lab Results   Component Value Date    CHOLESTEROL 172 12/10/2022     Lab Results   Component Value Date    HDL 38 (L) 12/10/2022     Lab Results   Component Value Date    TRIG 139 12/10/2022     Lab Results   Component Value Date    LDLCALC 106 (H) 12/10/2022     "

## 2024-03-15 ENCOUNTER — TELEPHONE (OUTPATIENT)
Dept: GASTROENTEROLOGY | Facility: CLINIC | Age: 39
End: 2024-03-15

## 2024-06-07 ENCOUNTER — TELEPHONE (OUTPATIENT)
Dept: BARIATRICS | Facility: CLINIC | Age: 39
End: 2024-06-07

## 2024-12-23 ENCOUNTER — TELEPHONE (OUTPATIENT)
Age: 39
End: 2024-12-23

## 2024-12-23 ENCOUNTER — OFFICE VISIT (OUTPATIENT)
Dept: FAMILY MEDICINE CLINIC | Facility: CLINIC | Age: 39
End: 2024-12-23
Payer: COMMERCIAL

## 2024-12-23 VITALS
OXYGEN SATURATION: 98 % | RESPIRATION RATE: 18 BRPM | WEIGHT: 235.4 LBS | SYSTOLIC BLOOD PRESSURE: 120 MMHG | HEIGHT: 71 IN | BODY MASS INDEX: 32.96 KG/M2 | TEMPERATURE: 98.4 F | DIASTOLIC BLOOD PRESSURE: 68 MMHG | HEART RATE: 92 BPM

## 2024-12-23 DIAGNOSIS — R19.7 DIARRHEA, UNSPECIFIED TYPE: ICD-10-CM

## 2024-12-23 PROCEDURE — 99213 OFFICE O/P EST LOW 20 MIN: CPT | Performed by: FAMILY MEDICINE

## 2024-12-23 RX ORDER — DICYCLOMINE HCL 20 MG
20 TABLET ORAL EVERY 6 HOURS
Qty: 120 TABLET | Refills: 0 | Status: SHIPPED | OUTPATIENT
Start: 2024-12-23

## 2024-12-23 RX ORDER — DICYCLOMINE HCL 20 MG
20 TABLET ORAL EVERY 6 HOURS
Qty: 120 TABLET | Refills: 0 | Status: SHIPPED | OUTPATIENT
Start: 2024-12-23 | End: 2024-12-23 | Stop reason: SDUPTHER

## 2024-12-23 RX ORDER — CHOLESTYRAMINE 4 G/9G
1 POWDER, FOR SUSPENSION ORAL 2 TIMES DAILY WITH MEALS
Qty: 60 PACKET | Refills: 2 | Status: SHIPPED | OUTPATIENT
Start: 2024-12-23

## 2024-12-23 RX ORDER — CHOLESTYRAMINE 4 G/9G
1 POWDER, FOR SUSPENSION ORAL 2 TIMES DAILY WITH MEALS
Qty: 60 PACKET | Refills: 2 | Status: SHIPPED | OUTPATIENT
Start: 2024-12-23 | End: 2024-12-23 | Stop reason: SDUPTHER

## 2024-12-23 NOTE — TELEPHONE ENCOUNTER
dicyclomine (BENTYL) 20 mg tablet       cholestyramine (QUESTRAN) 4 g packet       Patient called and requested that the above medications please be transferred to [facility] pharmacy in [place].    Please call him once transferred so that he knows he may pick them up.    Thank you!

## 2024-12-23 NOTE — ASSESSMENT & PLAN NOTE
Persistent following recent viral gastroenteritis. Tried Simethicone without relief. Cholestyramine helps with diarrhea but still having abdominal cramping and gas. Recommend trial of Bentyl and bland diet as tolerated. Could also incorporate probiotic. Follow up if not improved or any worsening.   Orders:  •  cholestyramine (QUESTRAN) 4 g packet; Take 1 packet (4 g total) by mouth 2 (two) times a day with meals  •  dicyclomine (BENTYL) 20 mg tablet; Take 1 tablet (20 mg total) by mouth every 6 (six) hours

## 2024-12-23 NOTE — TELEPHONE ENCOUNTER
Called patient and leave a detailed message, letting patient know provider re-send Rx to [facility], [address], [place]

## 2024-12-23 NOTE — PROGRESS NOTES
"Name: Samuel Andrade      : 1985      MRN: [id number]  Encounter Provider: Jennyfer Bridges DO  Encounter Date: 2024   Encounter department: [facility]  :  Assessment & Plan  Diarrhea, unspecified type  Persistent following recent viral gastroenteritis. Tried Simethicone without relief. Cholestyramine helps with diarrhea but still having abdominal cramping and gas. Recommend trial of Bentyl and bland diet as tolerated. Could also incorporate probiotic. Follow up if not improved or any worsening.   Orders:  •  cholestyramine (QUESTRAN) 4 g packet; Take 1 packet (4 g total) by mouth 2 (two) times a day with meals  •  dicyclomine (BENTYL) 20 mg tablet; Take 1 tablet (20 mg total) by mouth every 6 (six) hours           History of Present Illness     HPI  Had GI bug one week ago  - diarrhea and vomiting with associated chills but no fever. Just one day, then symptoms improved the following day, was using Cholestyramine powder which was helpful. Then has continued to have diarrhea and abdominal pain and cramping with excessive gas. Pain is in the upper abdomen, feels bloated with pain. Sometimes will have a bowel movement, other times not. Bowel movements are becoming more formed. Last dose of cholestyramine was last night, helps with diarrhea. Appetite is improving.     Review of Systems    Objective   /68 (BP Location: Left arm, Patient Position: Sitting, Cuff Size: Large)   Pulse 92   Temp 98.4 °F (36.9 °C) (Temporal)   Resp 18   Ht 5' 11\" (1.803 m)   Wt 107 kg (235 lb 6.4 oz)   SpO2 98%   BMI 32.83 kg/m²      Physical Exam  Vitals reviewed.   Constitutional:       Appearance: He is well-developed.   HENT:      Head: Normocephalic and atraumatic.   Eyes:      Extraocular Movements: Extraocular movements intact.      Conjunctiva/sclera: Conjunctivae normal.   Cardiovascular:      Rate and Rhythm: Normal rate and regular rhythm.   Pulmonary:      Effort: Pulmonary effort " is normal.   Abdominal:      General: Bowel sounds are normal.      Palpations: Abdomen is soft.      Tenderness: There is no abdominal tenderness.   Skin:     General: Skin is warm and dry.      Capillary Refill: Capillary refill takes less than 2 seconds.   Neurological:      Mental Status: He is alert.   Psychiatric:         Mood and Affect: Mood normal.         Behavior: Behavior normal.

## 2025-01-07 ENCOUNTER — OFFICE VISIT (OUTPATIENT)
Dept: FAMILY MEDICINE CLINIC | Facility: CLINIC | Age: 40
End: 2025-01-07
Payer: COMMERCIAL

## 2025-01-07 VITALS
HEIGHT: 73 IN | OXYGEN SATURATION: 97 % | HEART RATE: 77 BPM | BODY MASS INDEX: 30.8 KG/M2 | DIASTOLIC BLOOD PRESSURE: 80 MMHG | SYSTOLIC BLOOD PRESSURE: 120 MMHG | WEIGHT: 232.4 LBS | TEMPERATURE: 97.8 F | RESPIRATION RATE: 16 BRPM

## 2025-01-07 DIAGNOSIS — B34.9 VIRAL SYNDROME: Primary | ICD-10-CM

## 2025-01-07 DIAGNOSIS — R19.7 DIARRHEA, UNSPECIFIED TYPE: ICD-10-CM

## 2025-01-07 LAB
SL AMB POCT RAPID FLU A: NEGATIVE
SL AMB POCT RAPID FLU B: NEGATIVE

## 2025-01-07 PROCEDURE — 99213 OFFICE O/P EST LOW 20 MIN: CPT | Performed by: FAMILY MEDICINE

## 2025-01-07 PROCEDURE — 87804 INFLUENZA ASSAY W/OPTIC: CPT | Performed by: FAMILY MEDICINE

## 2025-01-07 RX ORDER — OSELTAMIVIR PHOSPHATE 75 MG/1
75 CAPSULE ORAL EVERY 12 HOURS SCHEDULED
Qty: 10 CAPSULE | Refills: 0 | Status: SHIPPED | OUTPATIENT
Start: 2025-01-07 | End: 2025-01-12

## 2025-01-07 RX ORDER — BENZONATATE 100 MG/1
100 CAPSULE ORAL 3 TIMES DAILY PRN
Qty: 20 CAPSULE | Refills: 0 | Status: SHIPPED | OUTPATIENT
Start: 2025-01-07

## 2025-01-07 RX ORDER — DICYCLOMINE HCL 20 MG
20 TABLET ORAL EVERY 6 HOURS
Qty: 120 TABLET | Refills: 0 | Status: SHIPPED | OUTPATIENT
Start: 2025-01-07

## 2025-01-07 NOTE — PROGRESS NOTES
"Name: Samuel Andrade      : 1985      MRN: [id number]  Encounter Provider: Jennyfer Bridges DO  Encounter Date: 2025   Encounter department: [facility]  :  Assessment & Plan  Viral syndrome  Flu A positive. Start Tamiflu and Tessalon for cough. Ibuprofen and Tylenol as needed for pain relief. Follow up if any worsening or new concerns.  Orders:  •  POCT rapid flu A and B  •  oseltamivir (TAMIFLU) 75 mg capsule; Take 1 capsule (75 mg total) by mouth every 12 (twelve) hours for 5 days  •  benzonatate (TESSALON PERLES) 100 mg capsule; Take 1 capsule (100 mg total) by mouth 3 (three) times a day as needed for cough    Diarrhea, unspecified type    Orders:  •  dicyclomine (BENTYL) 20 mg tablet; Take 1 tablet (20 mg total) by mouth every 6 (six) hours           History of Present Illness     Cough  Associated symptoms include chest pain, chills, headaches and shortness of breath.     Sick for 4 days, daughter is as well, tested positive for Flu A. Fevers have resolved, did have chills and myalgias, diarrhea. Now having chest pain and shortness with activities, cough is all the time.     Review of Systems   Constitutional:  Positive for chills.   Respiratory:  Positive for cough and shortness of breath.    Cardiovascular:  Positive for chest pain.   Neurological:  Positive for headaches.       Objective   /80 (BP Location: Right arm, Patient Position: Sitting, Cuff Size: Large)   Pulse 77   Temp 97.8 °F (36.6 °C) (Temporal)   Resp 16   Ht 6' 1\" (1.854 m)   Wt 105 kg (232 lb 6.4 oz)   SpO2 97%   BMI 30.66 kg/m²      Physical Exam  Vitals reviewed.   Constitutional:       Appearance: He is ill-appearing.   HENT:      Right Ear: External ear normal.      Left Ear: External ear normal.      Nose: Nose normal.      Mouth/Throat:      Mouth: Mucous membranes are moist.      Pharynx: Oropharynx is clear.   Eyes:      Extraocular Movements: Extraocular movements intact.      " Conjunctiva/sclera: Conjunctivae normal.   Cardiovascular:      Rate and Rhythm: Normal rate and regular rhythm.      Heart sounds: Normal heart sounds.   Pulmonary:      Effort: Pulmonary effort is normal.      Breath sounds: Normal breath sounds.   Abdominal:      General: Abdomen is flat.   Skin:     General: Skin is warm and dry.   Neurological:      General: No focal deficit present.      Mental Status: He is alert and oriented to person, place, and time.   Psychiatric:         Mood and Affect: Mood normal.         Behavior: Behavior normal.

## 2025-01-07 NOTE — ASSESSMENT & PLAN NOTE
Orders:  •  dicyclomine (BENTYL) 20 mg tablet; Take 1 tablet (20 mg total) by mouth every 6 (six) hours

## 2025-03-31 ENCOUNTER — NURSE TRIAGE (OUTPATIENT)
Age: 40
End: 2025-03-31

## 2025-03-31 NOTE — TELEPHONE ENCOUNTER
"FOLLOW UP: Pt called for a new pt appt for chest pain and tachycardia that is happening more often. Pt scheduled for 4/2/25 by a PEP, but pt asking for something sooner. I strongly recommended reporting to ED for an eval and PCP if he wants an appt sooner.    REASON FOR CONVERSATION: Chest Pain    SYMPTOMS: non radiating left sided chest pain, 10/10, ongoing for the past year, but occurring more frequently the past two weeks. Last incident was last week lasted for an hour  Pt also having periods of tachycardia, that does not occur with chest pain.     DISPOSITION: recommended ED    Reason for Disposition   Chest pain or 'angina' comes and goes and is happening more often (increasing in frequency) or getting worse (increasing in severity) (Exception: Chest pains that last only a few seconds.)    Answer Assessment - Initial Assessment Questions  1. LOCATION: \"Where does it hurt?\"        Left side  2. RADIATION: \"Does the pain go anywhere else?\" (e.g., into neck, jaw, arms, back)      Non radiating  3. ONSET: \"When did the chest pain begin?\" (Minutes, hours or days)       Began past  a year ago worse the last two weeks worse  4. PATTERN: \"Does the pain come and go, or has it been constant since it started?\"  \"Does it get worse with exertion?\"       Comes and goes  5. DURATION: \"How long does it last\" (e.g., seconds, minutes, hours)      Lasts for hour  6. SEVERITY: \"How bad is the pain?\"  (e.g., Scale 1-10; mild, moderate, or severe)      10/10  7. CARDIAC RISK FACTORS: \"Do you have any history of heart problems or risk factors for heart disease?\" (e.g., angina, prior heart attack; diabetes, high blood pressure, high cholesterol, smoker, or strong family history of heart disease)      New patient  8. PULMONARY RISK FACTORS: \"Do you have any history of lung disease?\"  (e.g., blood clots in lung, asthma, emphysema, birth control pills)      denies  9. CAUSE: \"What do you think is causing the chest pain?\"      " "unknown  10. OTHER SYMPTOMS: \"Do you have any other symptoms?\" (e.g., dizziness, nausea, vomiting, sweating, fever, difficulty breathing, cough)       SOB    Protocols used: Chest Pain-Adult-[place]    "

## 2025-03-31 NOTE — TELEPHONE ENCOUNTER
Regarding: Chest pain  ----- Message from Mitchell IRIZARRY sent at 3/31/2025  9:40 AM EDT -----  Patient called to schedule a new patient appointment. Patient says he has been having chest pains and an irregular heartbeat. Patient has been scheduled for the first appointment available on 4/3/25. He would like to speak with someone about his symptoms and possibly being scheduled for an appointment closer to where he lives. I did not submit an overbook due to patient not being triaged.

## 2025-04-02 ENCOUNTER — RA CDI HCC (OUTPATIENT)
Dept: OTHER | Facility: HOSPITAL | Age: 40
End: 2025-04-02

## 2025-04-04 ENCOUNTER — OFFICE VISIT (OUTPATIENT)
Dept: FAMILY MEDICINE CLINIC | Facility: CLINIC | Age: 40
End: 2025-04-04
Payer: COMMERCIAL

## 2025-04-04 VITALS
OXYGEN SATURATION: 98 % | DIASTOLIC BLOOD PRESSURE: 80 MMHG | SYSTOLIC BLOOD PRESSURE: 110 MMHG | WEIGHT: 243 LBS | TEMPERATURE: 98.2 F | BODY MASS INDEX: 32.2 KG/M2 | HEART RATE: 80 BPM | RESPIRATION RATE: 16 BRPM | HEIGHT: 73 IN

## 2025-04-04 DIAGNOSIS — R19.4 BOWEL HABIT CHANGES: ICD-10-CM

## 2025-04-04 DIAGNOSIS — R07.89 CHEST PAIN, ATYPICAL: Primary | ICD-10-CM

## 2025-04-04 DIAGNOSIS — K64.9 HEMORRHOIDS, UNSPECIFIED HEMORRHOID TYPE: ICD-10-CM

## 2025-04-04 DIAGNOSIS — R00.2 PALPITATIONS: ICD-10-CM

## 2025-04-04 DIAGNOSIS — E78.5 DYSLIPIDEMIA (HIGH LDL; LOW HDL): ICD-10-CM

## 2025-04-04 PROCEDURE — 99214 OFFICE O/P EST MOD 30 MIN: CPT | Performed by: NURSE PRACTITIONER

## 2025-04-04 PROCEDURE — 93000 ELECTROCARDIOGRAM COMPLETE: CPT | Performed by: NURSE PRACTITIONER

## 2025-04-04 RX ORDER — HYDROCORTISONE 25 MG/G
CREAM TOPICAL 2 TIMES DAILY
Qty: 28 G | Refills: 0 | Status: SHIPPED | OUTPATIENT
Start: 2025-04-04

## 2025-04-04 RX ORDER — SENNOSIDES 8.6 MG
650 CAPSULE ORAL
COMMUNITY
End: 2025-04-06 | Stop reason: ALTCHOICE

## 2025-04-04 RX ORDER — METRONIDAZOLE 7.5 MG/G
GEL TOPICAL
COMMUNITY
Start: 2025-04-02

## 2025-04-04 RX ORDER — DOXYCYCLINE 100 MG/1
CAPSULE ORAL
COMMUNITY
Start: 2025-04-02

## 2025-04-04 RX ORDER — CICLOPIROX 1 G/100ML
SHAMPOO TOPICAL
COMMUNITY
Start: 2025-04-02

## 2025-04-04 NOTE — PROGRESS NOTES
Name: Samuel Andrade      : 1985      MRN: [id number]  Encounter Provider: JOHANN Hooks  Encounter Date: 2025   Encounter department: [facility]  :  Assessment & Plan  Chest pain, atypical  Atypical left chest wall pain intermittent, random, started years ago, increasing in frequency and duration.   Will start with cardiac work up.   ECG in office today, NSR, normal ECG.   Will proceed with echo, stress test, and CXR.   Further plan of care pending results.   Orders:  •  POCT ECG  •  Echo complete w/ contrast if indicated; Future  •  Stress test only, exercise; Future  •  XR chest pa and lateral; Future    Palpitations  2-3 episodes of rapid heart rate, and generally not feeling well.   Will check holter monitor.   May need to consider extended monitor.   Orders:  •  Holter monitor; Future    Hemorrhoids, unspecified hemorrhoid type  Trial hydrocortisone cream. Refer to GI due to associated irregular bowel habits.     Orders:  •  Ambulatory Referral to Gastroenterology; Future  •  hydrocortisone (ANUSOL-HC) 2.5 % rectal cream; Apply topically 2 (two) times a day    Bowel habit changes  Blood work as noted.   Follow up with GI.  Last colonoscopy .     Orders:  •  CBC and differential; Future  •  Comprehensive metabolic panel; Future  •  TSH, 3rd generation; Future  •  C-reactive protein; Future  •  Ambulatory Referral to Gastroenterology; Future    Dyslipidemia (high LDL; low HDL)  Spoke at length as per patient's questions regarding difference between HDL and LDL.   Raise HDL with foods like avocado, olive oil, nuts, salmon, and regular exercise.   Lower LDL with mediterranean diet, weight loss, exercise.     Orders:  •  Lipid Panel with Direct LDL reflex; Future          Tobacco Cessation Counseling: Tobacco cessation counseling was provided. The patient is sincerely urged to quit consumption of tobacco. He is not ready to quit tobacco.       History of Present Illness   Samuel  "Lupe is a 39 year old male presenting today for chest pain and palpitations.     Pain left lower chest wall. Under left breast.   Not often, intermittent,   In the past would last seconds, and occur once every few months.   Now lasting about an hour. Occurs- once every 2-3 weeks.   Random, not related to specific activty.   No association with eating.   No radiation of pain.  Started years ago.    Not short of breath, but pain worsens with deep breathing.   Palpitations is not associated with pain.     Palpitations:  Started after flu virus in January of this year.  2-3 times now he has felt his heart was beating fast, and generally did not feel well with thsi.   Does not last more than 30 minutes.   No trouble breathing or dizzy, no chest pain generally did not feel well.   Occurred last week.     Notes history of hemorrhoids.   Requests hemorrhoid cream.  Rectal pain. Hemorrhoids   Usually always has diarrhea, recent constipation, odor is different worse than usual.   No black or bloody stools.   Small amount of blood with wiping at times.     Dermatology medications just ordered this week, he has not started yet, plans to start.     Using questran for diarrhea.               Review of Systems   Constitutional: Negative.    HENT: Negative.     Respiratory: Negative.     Cardiovascular:  Positive for chest pain and palpitations. Negative for leg swelling.   Gastrointestinal:  Positive for anal bleeding, constipation, diarrhea and rectal pain. Negative for abdominal distention, abdominal pain, blood in stool, nausea and vomiting.   Neurological:  Negative for dizziness, light-headedness and numbness.       Objective   /80   Pulse 80   Temp 98.2 °F (36.8 °C) (Temporal)   Resp 16   Ht 6' 1\" (1.854 m)   Wt 110 kg (243 lb)   SpO2 98%   BMI 32.06 kg/m²      Physical Exam  Vitals and nursing note reviewed.   Constitutional:       Appearance: Normal appearance. He is well-developed. He is not ill-appearing. "   HENT:      Head: Atraumatic.   Cardiovascular:      Rate and Rhythm: Normal rate and regular rhythm.      Heart sounds: No murmur heard.     Comments: No chest wall tenderness at site of pain.   Pulmonary:      Effort: Pulmonary effort is normal.      Breath sounds: Normal breath sounds.   Abdominal:      General: Bowel sounds are normal.      Palpations: Abdomen is soft.      Tenderness: There is no abdominal tenderness.   Musculoskeletal:      Cervical back: Normal range of motion and neck supple.      Right lower leg: No edema.      Left lower leg: No edema.   Lymphadenopathy:      Cervical: No cervical adenopathy.   Skin:     General: Skin is warm and dry.      Findings: No rash.   Neurological:      Mental Status: He is alert.      Gait: Gait normal.   Psychiatric:         Mood and Affect: Mood normal.         Current Outpatient Medications:   •  cholestyramine (QUESTRAN) 4 g packet, Take 1 packet (4 g total) by mouth 2 (two) times a day with meals, Disp: 60 packet, Rfl: 2  •  Ciclopirox 1 % shampoo, , Disp: , Rfl:   •  hydrocortisone (ANUSOL-HC) 2.5 % rectal cream, Apply topically 2 (two) times a day, Disp: 28 g, Rfl: 0  •  metroNIDAZOLE (METROGEL) 0.75 % gel, , Disp: , Rfl:   •  doxycycline hyclate (VIBRAMYCIN) 100 mg capsule, , Disp: , Rfl:

## 2025-04-04 NOTE — ASSESSMENT & PLAN NOTE
Trial hydrocortisone cream. Refer to GI due to associated irregular bowel habits.     Orders:  •  Ambulatory Referral to Gastroenterology; Future  •  hydrocortisone (ANUSOL-HC) 2.5 % rectal cream; Apply topically 2 (two) times a day

## 2025-04-05 ENCOUNTER — TRANSCRIBE ORDERS (OUTPATIENT)
Dept: LAB | Facility: CLINIC | Age: 40
End: 2025-04-05

## 2025-04-05 DIAGNOSIS — L92.3 FOREIGN BODY GRANULOMA OF SKIN AND SUBCUTANEOUS TISSUE: ICD-10-CM

## 2025-04-05 DIAGNOSIS — L20.89 FLEXURAL ATOPIC DERMATITIS: ICD-10-CM

## 2025-04-05 DIAGNOSIS — E78.5 HYPERLIPOPROTEINEMIA: ICD-10-CM

## 2025-04-05 DIAGNOSIS — L63.8 DIFFUSE ALOPECIA AREATA: ICD-10-CM

## 2025-04-05 DIAGNOSIS — L70.0 NODULAR ELASTOSIS WITH CYSTS AND COMEDONES OF FAVRE AND RACOUCHOT: Primary | ICD-10-CM

## 2025-04-05 DIAGNOSIS — E56.9 MULTIPLE VITAMIN DEFICIENCY DISEASE: ICD-10-CM

## 2025-04-05 DIAGNOSIS — B35.1 DERMATOPHYTOSIS OF NAIL: ICD-10-CM

## 2025-04-05 DIAGNOSIS — E55.9 AVITAMINOSIS D: ICD-10-CM

## 2025-04-05 DIAGNOSIS — L73.2 HIDRADENITIS: ICD-10-CM

## 2025-04-05 DIAGNOSIS — M32.10 LUPOID NEPHRITIS  (HCC): ICD-10-CM

## 2025-04-05 DIAGNOSIS — L57.8 NODULAR ELASTOSIS WITH CYSTS AND COMEDONES OF FAVRE AND RACOUCHOT: Primary | ICD-10-CM

## 2025-04-05 DIAGNOSIS — N08 LUPOID NEPHRITIS  (HCC): ICD-10-CM

## 2025-04-05 DIAGNOSIS — R17 ICTERUS: ICD-10-CM

## 2025-04-05 DIAGNOSIS — E22.9 PITUITARY HYPERFUNCTION (HCC): ICD-10-CM

## 2025-04-05 DIAGNOSIS — D50.9 IDIOPATHIC HYPOCHROMIC ANEMIA: ICD-10-CM

## 2025-04-05 DIAGNOSIS — Z11.59 SCREENING EXAMINATION FOR POLIOMYELITIS: ICD-10-CM

## 2025-04-05 DIAGNOSIS — A69.20 LYME DISEASE: ICD-10-CM

## 2025-04-05 DIAGNOSIS — L30.8 ACUTE VESICULAR DERMATITIS: ICD-10-CM

## 2025-04-05 DIAGNOSIS — Z91.018 ALLERGY TO OTHER FOODS: ICD-10-CM

## 2025-04-12 LAB
ALBUMIN SERPL-MCNC: 4.8 G/DL (ref 3.5–5.7)
ALP SERPL-CCNC: 32 U/L (ref 35–120)
ALT SERPL-CCNC: 17 U/L
ANION GAP SERPL CALCULATED.3IONS-SCNC: 6 MMOL/L (ref 3–11)
AST SERPL-CCNC: 16 U/L
BASOPHILS # BLD AUTO: 0 THOU/CMM (ref 0–0.1)
BASOPHILS NFR BLD AUTO: 0 %
BILIRUB SERPL-MCNC: 0.7 MG/DL (ref 0.2–1)
BUN SERPL-MCNC: 21 MG/DL (ref 7–28)
CALCIUM SERPL-MCNC: 9.6 MG/DL (ref 8.5–10.5)
CHLORIDE SERPL-SCNC: 103 MMOL/L (ref 100–109)
CHOLEST SERPL-MCNC: 155 MG/DL
CHOLEST/HDLC SERPL: 3.8 {RATIO}
CO2 SERPL-SCNC: 31 MMOL/L (ref 21–31)
CREAT SERPL-MCNC: 0.9 MG/DL (ref 0.53–1.3)
CRP SERPL-MCNC: 3 MG/L
CYTOLOGY CMNT CVX/VAG CYTO-IMP: ABNORMAL
DIFFERENTIAL METHOD BLD: ABNORMAL
EOSINOPHIL # BLD AUTO: 0 THOU/CMM (ref 0–0.5)
EOSINOPHIL NFR BLD AUTO: 1 %
ERYTHROCYTE [DISTWIDTH] IN BLOOD BY AUTOMATED COUNT: 15.2 % (ref 12–16)
GFR/BSA.PRED SERPLBLD CYS-BASED-ARV: 110 ML/MIN/{1.73_M2}
GLUCOSE SERPL-MCNC: 99 MG/DL (ref 65–99)
HCT VFR BLD AUTO: 45.6 % (ref 37–48)
HDLC SERPL-MCNC: 41 MG/DL (ref 23–92)
HGB BLD-MCNC: 15.7 G/DL (ref 12.5–17)
LDLC SERPL CALC-MCNC: 100 MG/DL
LYMPHOCYTES # BLD AUTO: 2.1 THOU/CMM (ref 1–3)
LYMPHOCYTES NFR BLD AUTO: 47 %
MCH RBC QN AUTO: 28.5 PG (ref 27–36)
MCHC RBC AUTO-ENTMCNC: 34.4 G/DL (ref 32–37)
MCV RBC AUTO: 83 FL (ref 80–100)
MONOCYTES # BLD AUTO: 0.4 THOU/CMM (ref 0.3–1)
MONOCYTES NFR BLD AUTO: 9 %
NEUTROPHILS # BLD AUTO: 1.9 THOU/CMM (ref 1.8–7.8)
NEUTROPHILS NFR BLD AUTO: 43 %
NONHDLC SERPL-MCNC: 114 MG/DL
PLATELET # BLD AUTO: 229 THOU/CMM (ref 140–350)
PMV BLD REES-ECKER: 7.9 FL (ref 7.5–11.3)
POTASSIUM SERPL-SCNC: 4.6 MMOL/L (ref 3.5–5.2)
PROT SERPL-MCNC: 7.4 G/DL (ref 6.3–8.3)
RBC # BLD AUTO: 5.51 MILL/CMM (ref 4–5.4)
SODIUM SERPL-SCNC: 140 MMOL/L (ref 135–145)
TRIGL SERPL-MCNC: 70 MG/DL
TSH SERPL-ACNC: 0.55 UIU/ML (ref 0.45–5.33)
WBC # BLD AUTO: 4.5 THOU/CMM (ref 4–10.5)

## 2025-04-14 ENCOUNTER — RESULTS FOLLOW-UP (OUTPATIENT)
Dept: FAMILY MEDICINE CLINIC | Facility: CLINIC | Age: 40
End: 2025-04-14

## 2025-04-14 DIAGNOSIS — R07.89 CHEST PAIN, ATYPICAL: Primary | ICD-10-CM

## 2025-04-14 NOTE — TELEPHONE ENCOUNTER
----- Message from JOHANN Vera sent at 4/14/2025 11:16 AM EDT -----  Please let patient know blood work is good.   I see his cardiac testing was not approved by insurance.   Please schedule follow up with [facility] cardiology [phone].   Please schedule follow up with gastroenterology as we discussed. [phone].

## 2025-04-14 NOTE — TELEPHONE ENCOUNTER
Patient aware and stated that he has a list of providers that take his insurance an will get scheduled.

## 2025-04-15 ENCOUNTER — TELEPHONE (OUTPATIENT)
Age: 40
End: 2025-04-15

## 2025-04-15 NOTE — TELEPHONE ENCOUNTER
Franca from [facility] called to have order fax for Stress test, Echo complete and holter monitor. Fax number [phone]

## 2025-04-22 ENCOUNTER — TELEPHONE (OUTPATIENT)
Dept: FAMILY MEDICINE CLINIC | Facility: CLINIC | Age: 40
End: 2025-04-22

## 2025-04-23 NOTE — TELEPHONE ENCOUNTER
Please contact Samuel.     Chest x-ray is normal.   Echocardiogram is good.   Stress test is negative.     Holter monitor results are still pending.   Will await these results.

## 2025-06-13 ENCOUNTER — TELEPHONE (OUTPATIENT)
Age: 40
End: 2025-06-13

## 2025-06-13 NOTE — TELEPHONE ENCOUNTER
Patient states he recently quit smoking marijuana. He has a new job and needs to submit a urine test and wants to know if there is anything that will cleanse his system. He purchased a cleansing kit but is still showing positive. He is due to have his urine tested tomorrow.    Please advise.

## 2025-06-13 NOTE — TELEPHONE ENCOUNTER
Marijuana typically stays present in a urine drug screen for 2-4 weeks, the heavier the use, the longer it stays in the urine.   I am not aware of any ways to clear the urine sooner.